# Patient Record
Sex: MALE | Race: WHITE | NOT HISPANIC OR LATINO | Employment: OTHER | URBAN - METROPOLITAN AREA
[De-identification: names, ages, dates, MRNs, and addresses within clinical notes are randomized per-mention and may not be internally consistent; named-entity substitution may affect disease eponyms.]

---

## 2017-04-17 ENCOUNTER — APPOINTMENT (OUTPATIENT)
Dept: LAB | Facility: CLINIC | Age: 77
End: 2017-04-17
Payer: MEDICARE

## 2017-04-17 DIAGNOSIS — N18.30 CHRONIC KIDNEY DISEASE, STAGE III (MODERATE) (HCC): ICD-10-CM

## 2017-04-17 LAB
ALBUMIN SERPL BCP-MCNC: 3.8 G/DL (ref 3.5–5)
ANION GAP SERPL CALCULATED.3IONS-SCNC: 7 MMOL/L (ref 4–13)
BUN SERPL-MCNC: 17 MG/DL (ref 5–25)
CALCIUM SERPL-MCNC: 8.8 MG/DL (ref 8.3–10.1)
CHLORIDE SERPL-SCNC: 109 MMOL/L (ref 100–108)
CO2 SERPL-SCNC: 27 MMOL/L (ref 21–32)
CREAT SERPL-MCNC: 1.4 MG/DL (ref 0.6–1.3)
GFR SERPL CREATININE-BSD FRML MDRD: 49.1 ML/MIN/1.73SQ M
GLUCOSE P FAST SERPL-MCNC: 100 MG/DL (ref 65–99)
PHOSPHATE SERPL-MCNC: 3.5 MG/DL (ref 2.3–4.1)
POTASSIUM SERPL-SCNC: 4.1 MMOL/L (ref 3.5–5.3)
PTH-INTACT SERPL-MCNC: 32.3 PG/ML (ref 14–72)
SODIUM SERPL-SCNC: 143 MMOL/L (ref 136–145)

## 2017-04-17 PROCEDURE — 83970 ASSAY OF PARATHORMONE: CPT

## 2017-04-17 PROCEDURE — 36415 COLL VENOUS BLD VENIPUNCTURE: CPT

## 2017-04-17 PROCEDURE — 80069 RENAL FUNCTION PANEL: CPT

## 2017-04-19 ENCOUNTER — ALLSCRIPTS OFFICE VISIT (OUTPATIENT)
Dept: OTHER | Facility: OTHER | Age: 77
End: 2017-04-19

## 2017-11-01 DIAGNOSIS — N18.30 CHRONIC KIDNEY DISEASE, STAGE III (MODERATE) (HCC): ICD-10-CM

## 2017-11-06 ENCOUNTER — APPOINTMENT (OUTPATIENT)
Dept: LAB | Facility: CLINIC | Age: 77
End: 2017-11-06
Payer: MEDICARE

## 2017-11-06 DIAGNOSIS — N18.30 CHRONIC KIDNEY DISEASE, STAGE III (MODERATE) (HCC): ICD-10-CM

## 2017-11-06 LAB
ALBUMIN SERPL BCP-MCNC: 3.3 G/DL (ref 3.5–5)
ALP SERPL-CCNC: 61 U/L (ref 46–116)
ALT SERPL W P-5'-P-CCNC: 14 U/L (ref 12–78)
ANION GAP SERPL CALCULATED.3IONS-SCNC: 8 MMOL/L (ref 4–13)
AST SERPL W P-5'-P-CCNC: 10 U/L (ref 5–45)
BILIRUB SERPL-MCNC: 0.5 MG/DL (ref 0.2–1)
BUN SERPL-MCNC: 17 MG/DL (ref 5–25)
CALCIUM SERPL-MCNC: 9 MG/DL (ref 8.3–10.1)
CHLORIDE SERPL-SCNC: 106 MMOL/L (ref 100–108)
CO2 SERPL-SCNC: 25 MMOL/L (ref 21–32)
CREAT SERPL-MCNC: 1.33 MG/DL (ref 0.6–1.3)
ERYTHROCYTE [DISTWIDTH] IN BLOOD BY AUTOMATED COUNT: 14.1 % (ref 11.6–15.1)
GFR SERPL CREATININE-BSD FRML MDRD: 51 ML/MIN/1.73SQ M
GLUCOSE P FAST SERPL-MCNC: 108 MG/DL (ref 65–99)
HCT VFR BLD AUTO: 37.4 % (ref 36.5–49.3)
HGB BLD-MCNC: 12.4 G/DL (ref 12–17)
MCH RBC QN AUTO: 30.8 PG (ref 26.8–34.3)
MCHC RBC AUTO-ENTMCNC: 33.2 G/DL (ref 31.4–37.4)
MCV RBC AUTO: 93 FL (ref 82–98)
PLATELET # BLD AUTO: 369 THOUSANDS/UL (ref 149–390)
PMV BLD AUTO: 9.8 FL (ref 8.9–12.7)
POTASSIUM SERPL-SCNC: 4.1 MMOL/L (ref 3.5–5.3)
PROT SERPL-MCNC: 7.1 G/DL (ref 6.4–8.2)
RBC # BLD AUTO: 4.03 MILLION/UL (ref 3.88–5.62)
SODIUM SERPL-SCNC: 139 MMOL/L (ref 136–145)
WBC # BLD AUTO: 11.7 THOUSAND/UL (ref 4.31–10.16)

## 2017-11-06 PROCEDURE — 80053 COMPREHEN METABOLIC PANEL: CPT

## 2017-11-06 PROCEDURE — 36415 COLL VENOUS BLD VENIPUNCTURE: CPT

## 2017-11-06 PROCEDURE — 85027 COMPLETE CBC AUTOMATED: CPT

## 2017-11-07 ENCOUNTER — ALLSCRIPTS OFFICE VISIT (OUTPATIENT)
Dept: OTHER | Facility: OTHER | Age: 77
End: 2017-11-07

## 2017-11-08 NOTE — PROGRESS NOTES
Assessment  1  CKD (chronic kidney disease), stage III (585 3) (N18 3)   2  Diabetes mellitus, type 2 (250 00) (E11 9)   3  Benign hypertension (401 1) (I10)    Plan  CKD (chronic kidney disease), stage III    · (1) CBC/ PLT (NO DIFF); Status:Active; Requested EOT:[id number]; Perform:[facility]; PFO:[id number];JRCVMWZ; For:CKD (chronic kidney disease), stage III; Ordered By:Demi Berman;   · (1) COMPREHENSIVE METABOLIC PANEL; Status:Active; Requested JDD:[id number]; Perform:[facility]; CSI:[id number];AMUILNW; For:CKD (chronic kidney disease), stage III; Ordered By:Demi Berman;   · (1) URINE PROTEIN CREATININE RATIO; Status:Active; Requested KOZ:[id number]; Perform:[facility]; XGO:[id number];MLKYBIB; For:CKD (chronic kidney disease), stage III; Ordered By:Demi Berman;   · Do not take anti-inflammatory medicines other than aspirin ; Status:Complete;   Done:  36MLQ4915   Ordered; For:CKD (chronic kidney disease), stage III; Ordered By:Demi Berman;   · Restrict the salt in your diet by avoiding highly salted foods ; Status:Complete;   Done:  53CUW6435   Ordered; For:CKD (chronic kidney disease), stage III; Ordered By:Demi Berman;     Discussion/Summary    1 ) Chronic kidney disease stage III without proteinuriaBaseline creatinine is 1 4-1 6 mg/dL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, as well as component of diabetesMost recent creatinine is 1 33 mg/dL and is stable at his baselineMost recent urine protein creatinine ratio is 0 1  HypertensionBlood pressure at goalMetoprolol extended release 200 mg dailyContinue amlodipine 5 mg dailyLow 2 g sodium dietAvoid NSAIDs  Diabetes mellitus type 2On metformin 500 mg daily  Chronic lymphocytic leukemiaCurrently receiving Gamma Gavin infusions every 2 monthsFollow with hematology/oncology  EdemaMuch improvedI suspected that it was related to the calcium channel blocker, increasing the dose may lead to edema developing again  Mineral bone disorderâchronic kidney diseasePTH at goalPhosphorus and calcium are normalHis vitamin D stores have improved to our target range continue current vitamin D 1000 units daily  PneumoniaStarted on Levaquin  I have asked the patient close monitoring his blood sugars while on the antibiotics S2 also maybe take a probiotic concurrently  The patient was counseled regarding diagnostic results,-- instructions for management,-- risk factor reductions,-- prognosis,-- patient and family education,-- impressions,-- risks and benefits of treatment options,-- importance of compliance with treatment  total time of encounter was 30 minutes  Possible side effects of new medications were reviewed with the patient/guardian today  The treatment plan was reviewed with the patient/guardian  The patient/guardian understands and agrees with the treatment plan   He has no barriers to learning  CKD Teaching includes NFK Guidelines, hypertension management, Avoid nephrotoxic medication, dietician counseling, sodium restriction and fluid management  Current Patient Status: no anemia-- and-- no worsening of renal function  He needs a follow up session in 6 months   Discussed with the patient      Reason For Visit  Chronic kidney disease      History of Present Illness  I the pleasure of seeing her today in the renal clinic for the continued management of his chronic kidney disease  He was recently diagnosed with pneumonia and started on levofloxacin for which she will take for the next 10 days  Otherwise he feels fine with no fevers or chills but he is having a productive cough  No nausea no vomiting or diarrhea  He does report compliance with his blood pressure medication  Reports no lightheadedness or dizziness  Review of Systems    Constitutional: No complaints of fever, no chills, no anorexia, no tiredness, no recent weight gain or weight loss  Integumentary: No complaints of skin rash     Gastrointestinal: No complains of abdominal pain, no constipation or diarrhea, no nausea or vomiting  Respiratory: coughing up sputum, but-- no shortness of breath-- and-- no cough  Cardiovascular: No complaints of orthopnea, no PND, no chest pain, no palpitations, no lower extremity edema  Musculoskeletal: No complaints of joint pain or swelling  Neurological: No complaints of headache, no lightheadedness or dizziness  Genitourinary: No dysuria, no hematuria, no nocturia, no urinary frequency, no incomplete emptying of bladder, no foamy urine  Eyes: No complaints of eyesight problems or dryness of eyes  ENT: no complaints of hearing loss, no nasal discharge  ROS reviewed  Past Medical History    The active problems and past medical history were reviewed and updated today  Surgical History    The surgical history was reviewed and updated today  Family History    The family history was reviewed and updated today  Social History  The social history was reviewed and updated today  The social history was reviewed and is unchanged  Current Meds   1  AmLODIPine Besylate 5 MG Oral Tablet; TAKE 1 TABLET DAILY; Therapy: 09UCS7045 to (Evaluate:10Jan2015) Recorded   2  Aspirin 81 MG TABS; take 2 tablet daily; Therapy: 14TRX2998 to Recorded   3  Fish Oil 1200 MG Oral Capsule; take 1 capsule daily; Therapy: 69Qfd7180 to Recorded   4  GamaSTAN S/D Intramuscular Injectable; USE AS DIRECTED; Therapy: 36SRH7730 to Recorded   5  MetFORMIN HCl - 500 MG Oral Tablet; TAKE 1 TABLET DAILY WITH FOOD; Therapy: 40BIX4470 to Recorded   6  Metoprolol Succinate  MG Oral Tablet Extended Release 24 Hour; TAKE 1 TABLET   ONCE DAILY; Therapy: 83IQY1097 to Recorded   7  Pravastatin Sodium 20 MG Oral Tablet; TAKE 1 TABLET DAILY; Therapy: 04TII2881 to Recorded   8  Vitamin D 1000 UNIT Oral Tablet; TAKE AS DIRECTED PER PACKAGE INSTRUCTIONS;    Therapy: 15Apr2015 to (Last Rx:15Apr2015)  Requested for: [phone] Ordered    The medication list was reviewed and updated today  Allergies  1  No Known Drug Allergies    Vitals  Vital Signs    Recorded: 77WRX4302 08:46AM Recorded: 04XSX5769 32:68UX   Systolic 438    Diastolic 78    Height  5 ft 10 in   Weight  203 lb    BMI Calculated  29 13   BSA Calculated  2 1     Physical Exam    Constitutional: General appearance: No acute distress, well appearing and well nourished  ENT: External ears and nose appear normal      Eyes: Anicteric sclerae  Neck: No bruit heard over either carotid  JVD:  No JVD present  Pulmonary: Respiratory effort: No increased work of breathing or signs of respiratory distress  -- Auscultation of lungs: Clear to auscultation  Cardiovascular: Auscultation of heart: Normal rate and rhythm, normal S1 and S2, without murmurs  Abdomen: Non-tender, no masses  Extremities: No cyanosis, clubbing or edema  Pulses: Dorsalis Pedis and Posterior Tibial pulses normal    Rash: No rash present  Neurologic: Non Focal      Psychiatric: Orientation to person, place, and time: Normal  -- and-- Mood and affect: Normal     Back: No CVA tenderness  Results/Data  (1) COMPREHENSIVE METABOLIC PANEL 88LGL0100 76:20LI Eran Breath Order Number: [id number]     Test Name Result Flag Reference   SODIUM 139 mmol/L  136-145   POTASSIUM 4 1 mmol/L  3 5-5 3   CHLORIDE 106 mmol/L  100-108   CARBON DIOXIDE 25 mmol/L  21-32   ANION GAP (CALC) 8 mmol/L  4-13   BLOOD UREA NITROGEN 17 mg/dL  5-25   CREATININE 1 33 mg/dL H 0 60-1 30   Standardized to IDMS reference method   CALCIUM 9 0 mg/dL  8 3-10 1   BILI, TOTAL 0 50 mg/dL  0 20-1 00   ALK PHOSPHATAS 61 U/L     ALT (SGPT) 14 U/L  12-78   Specimen collection should occur prior to Sulfasalazine and/or Sulfapyridine administration due to the potential for falsely depressed results     AST(SGOT) 10 U/L  5-45   Specimen collection should occur prior to Sulfasalazine administration due to the potential for falsely depressed results  ALBUMIN 3 3 g/dL L 3 5-5 0   TOTAL PROTEIN 7 1 g/dL  6 4-8 2   eGFR 51 ml/min/1 73sq m     [facility] Disease Education Program recommendations are as follows:  GFR calculation is accurate only with a steady state creatinine  Chronic Kidney disease less than 60 ml/min/1 73 sq  meters  Kidney failure less than 15 ml/min/1 73 sq  meters  GLUCOSE FASTING 108 mg/dL H 65-99   Specimen collection should occur prior to Sulfasalazine administration due to the potential for falsely depressed results  Specimen collection should occur prior to Sulfapyridine administration due to the potential for falsely elevated results  (1) CBC/ PLT (NO DIFF) 08LML8046 08:13AM Verbena Brunner    Order Number: [id number]     Test Name Result Flag Reference   HEMATOCRIT 37 4 %  36 5-49 3   HEMOGLOBIN 12 4 g/dL  12 0-17 0   MCHC 33 2 g/dL  31 4-37 4   MCH 30 8 pg  26 8-34 3   MCV 93 fL  82-98   PLATELET COUNT 170 Thousands/uL  149-390   RBC COUNT 4 03 Million/uL  3 88-5 62   RDW 14 1 %  11 6-15 1   WBC COUNT 11 70 Thousand/uL H 4 31-10 16   MPV 9 8 fL  8 9-12 7     Diagnostic Studies Reviewed: I personally reviewed the films/images/results in the office today  My interpretation follows  Thank you very much for seeing this patient  If you have any questions, please do not hesitate to contact me        Signatures   Electronically signed by : FERCHO Hernández ; Nov 7 2017  8:49AM EST                       (Author)

## 2017-12-05 ENCOUNTER — TRANSCRIBE ORDERS (OUTPATIENT)
Dept: LAB | Facility: CLINIC | Age: 77
End: 2017-12-05

## 2017-12-05 ENCOUNTER — APPOINTMENT (OUTPATIENT)
Dept: LAB | Facility: CLINIC | Age: 77
End: 2017-12-05
Payer: MEDICARE

## 2017-12-05 DIAGNOSIS — N13.8 ENLARGED PROSTATE WITH URINARY OBSTRUCTION: Primary | ICD-10-CM

## 2017-12-05 DIAGNOSIS — N40.1 ENLARGED PROSTATE WITH URINARY OBSTRUCTION: Primary | ICD-10-CM

## 2017-12-05 DIAGNOSIS — N13.8 ENLARGED PROSTATE WITH URINARY OBSTRUCTION: ICD-10-CM

## 2017-12-05 DIAGNOSIS — N40.1 ENLARGED PROSTATE WITH URINARY OBSTRUCTION: ICD-10-CM

## 2017-12-05 LAB
BUN SERPL-MCNC: 18 MG/DL (ref 5–25)
CREAT SERPL-MCNC: 1.33 MG/DL (ref 0.6–1.3)
GFR SERPL CREATININE-BSD FRML MDRD: 51 ML/MIN/1.73SQ M
PSA SERPL-MCNC: 1 NG/ML (ref 0–4)

## 2017-12-05 PROCEDURE — 36415 COLL VENOUS BLD VENIPUNCTURE: CPT

## 2017-12-05 PROCEDURE — 84520 ASSAY OF UREA NITROGEN: CPT

## 2017-12-05 PROCEDURE — 82565 ASSAY OF CREATININE: CPT

## 2017-12-05 PROCEDURE — G0103 PSA SCREENING: HCPCS

## 2018-01-13 VITALS
DIASTOLIC BLOOD PRESSURE: 60 MMHG | BODY MASS INDEX: 30.78 KG/M2 | WEIGHT: 215 LBS | SYSTOLIC BLOOD PRESSURE: 146 MMHG | HEIGHT: 70 IN

## 2018-01-14 VITALS
HEIGHT: 70 IN | BODY MASS INDEX: 29.06 KG/M2 | SYSTOLIC BLOOD PRESSURE: 128 MMHG | WEIGHT: 203 LBS | DIASTOLIC BLOOD PRESSURE: 78 MMHG

## 2018-01-14 NOTE — CONSULTS
I am referring Leydi Gonsalez to you for further evaluation  My most recent evaluation follows:      Reason For Visit  Chronic kidney disease      History of Present Illness  I the pleasure of seeing her today in the renal clinic for the continued management of his chronic kidney disease  He was recently diagnosed with pneumonia and started on levofloxacin for which she will take for the next 10 days  Otherwise he feels fine with no fevers or chills but he is having a productive cough  No nausea no vomiting or diarrhea  He does report compliance with his blood pressure medication  Reports no lightheadedness or dizziness  Review of Systems    Constitutional: No complaints of fever, no chills, no anorexia, no tiredness, no recent weight gain or weight loss  Integumentary: No complaints of skin rash  Gastrointestinal: No complains of abdominal pain, no constipation or diarrhea, no nausea or vomiting  Respiratory: coughing up sputum, but no shortness of breath and no cough  Cardiovascular: No complaints of orthopnea, no PND, no chest pain, no palpitations, no lower extremity edema  Musculoskeletal: No complaints of joint pain or swelling  Neurological: No complaints of headache, no lightheadedness or dizziness  Genitourinary: No dysuria, no hematuria, no nocturia, no urinary frequency, no incomplete emptying of bladder, no foamy urine  Eyes: No complaints of eyesight problems or dryness of eyes  ENT: no complaints of hearing loss, no nasal discharge  ROS reviewed  Past Medical History    The active problems and past medical history were reviewed and updated today  Surgical History    The surgical history was reviewed and updated today  Family History    The family history was reviewed and updated today  Social History  The social history was reviewed and updated today  The social history was reviewed and is unchanged  Current Meds   1   AmLODIPine Besylate 5 MG Oral Tablet; TAKE 1 TABLET DAILY; Therapy: 86VTG7126 to (Evaluate:10Jan2015) Recorded   2  Aspirin 81 MG TABS; take 2 tablet daily; Therapy: 09KAX6469 to Recorded   3  Fish Oil 1200 MG Oral Capsule; take 1 capsule daily; Therapy: 52Yui4746 to Recorded   4  GamaSTAN S/D Intramuscular Injectable; USE AS DIRECTED; Therapy: 14DOA9655 to Recorded   5  MetFORMIN HCl - 500 MG Oral Tablet; TAKE 1 TABLET DAILY WITH FOOD; Therapy: 27VHD0137 to Recorded   6  Metoprolol Succinate  MG Oral Tablet Extended Release 24 Hour; TAKE 1 TABLET   ONCE DAILY; Therapy: 82CXH6029 to Recorded   7  Pravastatin Sodium 20 MG Oral Tablet; TAKE 1 TABLET DAILY; Therapy: 28KAD0374 to Recorded   8  Vitamin D 1000 UNIT Oral Tablet; TAKE AS DIRECTED PER PACKAGE INSTRUCTIONS; Therapy: 15Apr2015 to (Last Rx:15Apr2015)  Requested for: 15Apr2015 Ordered    The medication list was reviewed and updated today  Allergies    1  No Known Drug Allergies    Vitals   Recorded: 02BHH0786 08:46AM Recorded: 69BCQ8096 75:53SJ   Systolic 149    Diastolic 78    Height  5 ft 10 in   Weight  203 lb    BMI Calculated  29 13   BSA Calculated  2 1     Physical Exam    Constitutional: General appearance: No acute distress, well appearing and well nourished  ENT: External ears and nose appear normal      Eyes: Anicteric sclerae  Neck: No bruit heard over either carotid  JVD:  No JVD present  Pulmonary: Respiratory effort: No increased work of breathing or signs of respiratory distress  Auscultation of lungs: Clear to auscultation  Cardiovascular: Auscultation of heart: Normal rate and rhythm, normal S1 and S2, without murmurs  Abdomen: Non-tender, no masses  Extremities: No cyanosis, clubbing or edema  Pulses: Dorsalis Pedis and Posterior Tibial pulses normal    Rash: No rash present     Neurologic: Non Focal      Psychiatric: Orientation to person, place, and time: Normal   and Mood and affect: Normal     Back: No CVA tenderness  Results/Data  (1) COMPREHENSIVE METABOLIC PANEL 22POF7822 75:15CQ Dusty Meyer Order Number: [id number]     Test Name Result Flag Reference   SODIUM 139 mmol/L  136-145   POTASSIUM 4 1 mmol/L  3 5-5 3   CHLORIDE 106 mmol/L  100-108   CARBON DIOXIDE 25 mmol/L  21-32   ANION GAP (CALC) 8 mmol/L  4-13   BLOOD UREA NITROGEN 17 mg/dL  5-25   CREATININE 1 33 mg/dL H 0 60-1 30   Standardized to IDMS reference method   CALCIUM 9 0 mg/dL  8 3-10 1   BILI, TOTAL 0 50 mg/dL  0 20-1 00   ALK PHOSPHATAS 61 U/L     ALT (SGPT) 14 U/L  12-78   Specimen collection should occur prior to Sulfasalazine and/or Sulfapyridine administration due to the potential for falsely depressed results  AST(SGOT) 10 U/L  5-45   Specimen collection should occur prior to Sulfasalazine administration due to the potential for falsely depressed results  ALBUMIN 3 3 g/dL L 3 5-5 0   TOTAL PROTEIN 7 1 g/dL  6 4-8 2   eGFR 51 ml/min/1 73sq [facility] Disease Education Program recommendations are as follows:  GFR calculation is accurate only with a steady state creatinine  Chronic Kidney disease less than 60 ml/min/1 73 sq  meters  Kidney failure less than 15 ml/min/1 73 sq  meters  GLUCOSE FASTING 108 mg/dL H 65-99   Specimen collection should occur prior to Sulfasalazine administration due to the potential for falsely depressed results  Specimen collection should occur prior to Sulfapyridine administration due to the potential for falsely elevated results       (1) CBC/ PLT (NO DIFF) 73FSY8233 08:13AM Dusty Meyer Order Number: [id number]     Test Name Result Flag Reference   HEMATOCRIT 37 4 %  36 5-49 3   HEMOGLOBIN 12 4 g/dL  12 0-17 0   MCHC 33 2 g/dL  31 4-37 4   MCH 30 8 pg  26 8-34 3   MCV 93 fL  82-98   PLATELET COUNT 184 Thousands/uL  149-390   RBC COUNT 4 03 Million/uL  3 88-5 62   RDW 14 1 %  11 6-15 1   WBC COUNT 11 70 Thousand/uL H 4 31-10 16   MPV 9 8 fL  8 9-12 7     I personally reviewed the films/images/results in the office today  My interpretation follows  Assessment    1  CKD (chronic kidney disease), stage III (585 3) (N18 3)   2  Diabetes mellitus, type 2 (250 00) (E11 9)   3  Benign hypertension (401 1) (I10)    Plan  CKD (chronic kidney disease), stage III    · (1) CBC/ PLT (NO DIFF); Status:Active; Requested DKB:[id number]; Perform:[facility]; VRM:[id number];DMCGVPD; For:CKD (chronic kidney disease), stage III; Ordered By:Demi Berman;   · (1) COMPREHENSIVE METABOLIC PANEL; Status:Active; Requested MTF:[id number]; Perform:[facility]; QNH:[id number];OFKOOHP; For:CKD (chronic kidney disease), stage III; Ordered By:Demi Berman;   · (1) URINE PROTEIN CREATININE RATIO; Status:Active; Requested STEPHANIE:[id number]; Perform:[facility]; KYN:[id number];UHQRLUZ; For:CKD (chronic kidney disease), stage III; Ordered By:Demi Berman;   · Do not take anti-inflammatory medicines other than aspirin ; Status:Complete;   Done:  85XVW1480   Ordered; For:CKD (chronic kidney disease), stage III; Ordered By:Demi Berman;   · Restrict the salt in your diet by avoiding highly salted foods ; Status:Complete;   Done:  16DEN1742   Ordered; For:CKD (chronic kidney disease), stage III; Ordered By:Demi Berman;     Discussion/Summary    1 ) Chronic kidney disease stage III without proteinuria  - Baseline creatinine is 1 4-1 6 mg/dL  - 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, as well as component of diabetes  - Most recent creatinine is 1 33 mg/dL and is stable at his baseline  - Most recent urine protein creatinine ratio is 0 1    2 ) Hypertension  - Blood pressure at goal  - Metoprolol extended release 200 mg daily  - Continue amlodipine 5 mg daily  - Low 2 g sodium diet  - Avoid NSAIDs    3 ) Diabetes mellitus type 2  - On metformin 500 mg daily    4 ) Chronic lymphocytic leukemia  - Currently receiving Gamma Gavin infusions every 2 months  - Follow with hematology/oncology    5 ) Edema  - Much improved  - I suspected that it was related to the calcium channel blocker, increasing the dose may lead to edema developing again    6 ) Mineral bone disorder-chronic kidney disease  - PTH at goal  - Phosphorus and calcium are normal  - His vitamin D stores have improved to our target range continue current vitamin D 1000 units daily  7 ) Pneumonia  - Started on Levaquin  I have asked the patient close monitoring his blood sugars while on the antibiotics S2 also maybe take a probiotic concurrently  The patient was counseled regarding diagnostic results, instructions for management, risk factor reductions, prognosis, patient and family education, impressions, risks and benefits of treatment options, importance of compliance with treatment  total time of encounter was 30 minutes  Possible side effects of new medications were reviewed with the patient/guardian today  The treatment plan was reviewed with the patient/guardian  The patient/guardian understands and agrees with the treatment plan   He has no barriers to learning  CKD Teaching includes NFK Guidelines, hypertension management, Avoid nephrotoxic medication, dietician counseling, sodium restriction and fluid management  Current Patient Status: no anemia and no worsening of renal function  He needs a follow up session in 6 months   Discussed with the patient      Thank you very much for seeing this patient  If you have any questions, please do not hesitate to contact me        Signatures   Electronically signed by : FERCHO Figueroa ; Nov 7 2017  8:49AM EST                       (Author)

## 2018-06-01 DIAGNOSIS — N18.30 CHRONIC KIDNEY DISEASE, STAGE III (MODERATE) (HCC): ICD-10-CM

## 2018-06-13 ENCOUNTER — OFFICE VISIT (OUTPATIENT)
Dept: NEPHROLOGY | Facility: CLINIC | Age: 78
End: 2018-06-13
Payer: MEDICARE

## 2018-06-13 VITALS
BODY MASS INDEX: 29.62 KG/M2 | WEIGHT: 200 LBS | HEIGHT: 69 IN | SYSTOLIC BLOOD PRESSURE: 138 MMHG | DIASTOLIC BLOOD PRESSURE: 70 MMHG

## 2018-06-13 DIAGNOSIS — C91.10 CLL (CHRONIC LYMPHOCYTIC LEUKEMIA) (HCC): ICD-10-CM

## 2018-06-13 DIAGNOSIS — I10 ESSENTIAL HYPERTENSION: ICD-10-CM

## 2018-06-13 DIAGNOSIS — N18.30 CKD (CHRONIC KIDNEY DISEASE) STAGE 3, GFR 30-59 ML/MIN (HCC): Primary | ICD-10-CM

## 2018-06-13 DIAGNOSIS — I33.0 BACTERIAL ENDOCARDITIS, UNSPECIFIED CHRONICITY: ICD-10-CM

## 2018-06-13 PROBLEM — E11.9 TYPE 2 DIABETES MELLITUS, WITHOUT LONG-TERM CURRENT USE OF INSULIN (HCC): Status: ACTIVE | Noted: 2018-06-13

## 2018-06-13 PROBLEM — I63.9 CEREBROVASCULAR ACCIDENT (CVA) DUE TO EMBOLISM (HCC): Status: ACTIVE | Noted: 2018-06-13

## 2018-06-13 PROCEDURE — 99214 OFFICE O/P EST MOD 30 MIN: CPT | Performed by: INTERNAL MEDICINE

## 2018-06-13 RX ORDER — AMOXICILLIN 500 MG
1 CAPSULE ORAL DAILY
COMMUNITY
Start: 2015-04-15

## 2018-06-13 RX ORDER — PRAVASTATIN SODIUM 20 MG
1 TABLET ORAL DAILY
COMMUNITY
Start: 2014-12-11 | End: 2020-01-01 | Stop reason: ALTCHOICE

## 2018-06-13 RX ORDER — METOPROLOL SUCCINATE 200 MG/1
1 TABLET, EXTENDED RELEASE ORAL DAILY
COMMUNITY
Start: 2014-12-11 | End: 2020-01-01

## 2018-06-13 RX ORDER — AMLODIPINE BESYLATE 5 MG/1
1 TABLET ORAL DAILY
COMMUNITY
Start: 2014-12-11 | End: 2020-01-01 | Stop reason: ALTCHOICE

## 2018-06-13 NOTE — PROGRESS NOTES
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  He was treated for sepsis with a neutropenic fever and had been treated for left lower lobe pneumonia during a prior admission  He was found to have 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 and was treated with antibiotics  He completed antibiotics on May 17th and his PICC line was removed on May 18th  He has a follow-up with Cardiology next week  He currently feels well he has no residual symptoms from the CVA  We were not consulted during the hospitalization as his renal function had surprisingly been stable throughout the course his last blood work was done on May 3rd and his creatinine was 1 5 which is his baseline  He is no longer on Gamma Infusions for CLL      1 ) Chronic kidney disease stage III without proteinuria  · Baseline creatinine is 1 4-1 6 mg/dL  · 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, as well as component of diabetes  · Most recent creatinine is 1 5 mg/dL and is stable at his baseline  · Check a urine protein creatinine ratio at the next visit  · Renal function had remained stable during all the hospitalization     2 ) Hypertension  · Blood pressure acceptable  · Metoprolol extended release 200 mg daily  · Continue amlodipine 5 mg daily  · Low 2 g sodium diet  · Avoid NSAIDs     3 ) Diabetes mellitus type 2  · On metformin 500 mg daily     4 ) Chronic lymphocytic leukemia  · No longer receiving Gamma Gavin infusions  · Follow with hematology/oncology     5 ) Infective Endocardis  -completed antibiotics on May 17th  -follow-up with Cardiology next week  -strep pneumoniae septicemia, MV infective endocarditis     6 ) Mineral bone disorder-chronic kidney disease  · PTH at goal  · Phosphorus and calcium are normal  · His vitamin D stores have improved to our target range continue current vitamin D 1000 units daily  PATIENT INSTRUCTIONS:    Patient Instructions   1 )  Low 2 g sodium diet    2 )  Monitor weights at home    3 )  Avoid NSAIDs    4 )  Monitor blood pressure at home, call if blood pressure greater than 150/90 persistently            Orders Placed This Encounter   Procedures    Comprehensive metabolic panel     This is a patient instruction: Patient fasting for 8 hours or longer recommended  Standing Status:   Future     Standing Expiration Date:   6/13/2019    CBC     Standing Status:   Future     Standing Expiration Date:   6/13/2019    Protein / creatinine ratio, urine     Standing Status:   Future     Standing Expiration Date:   6/13/2019       OBJECTIVE:  Current Weight: Weight - Scale: 90 7 kg (200 lb)  Vitals:    06/13/18 1142   BP: 138/70   Weight: 90 7 kg (200 lb)   Height: 5' 9" (1 753 m)    Body mass index is 29 53 kg/m²  REVIEW OF SYSTEMS:    Review of Systems   Constitutional: Negative for activity change and fever  Respiratory: Negative for cough, chest tightness, shortness of breath and wheezing  Cardiovascular: Negative for chest pain and leg swelling  Gastrointestinal: Negative for abdominal pain, diarrhea, nausea and vomiting  Endocrine: Negative for polyuria  Genitourinary: Negative for difficulty urinating, dysuria, flank pain, frequency and urgency  Skin: Negative for rash  Neurological: Negative for dizziness, syncope, light-headedness and headaches  PHYSICAL EXAM:      Physical Exam   Constitutional: He is oriented to person, place, and time   He appears well-developed and well-nourished  No distress  HENT:   Head: Normocephalic and atraumatic  Eyes: Pupils are equal, round, and reactive to light  No scleral icterus  Neck: Normal range of motion  Neck supple  Cardiovascular: Normal rate, regular rhythm and normal heart sounds  Exam reveals no gallop and no friction rub  No murmur heard  Pulmonary/Chest: Effort normal and breath sounds normal  No respiratory distress  He has no wheezes  He has no rales  He exhibits no tenderness  Abdominal: Soft  Bowel sounds are normal  He exhibits no distension  There is no tenderness  There is no rebound  Musculoskeletal: Normal range of motion  He exhibits no edema  Neurological: He is alert and oriented to person, place, and time  Skin: No rash noted  He is not diaphoretic  Psychiatric: He has a normal mood and affect  Nursing note and vitals reviewed        Medications:    Current Outpatient Prescriptions:     amLODIPine (NORVASC) 5 mg tablet, Take 1 tablet by mouth daily, Disp: , Rfl:     aspirin 81 MG tablet, Take 2 tablets by mouth daily, Disp: , Rfl:     cholecalciferol (VITAMIN D3) 1,000 units tablet, Take 1,000 Units by mouth daily, Disp: , Rfl:     metFORMIN (GLUCOPHAGE) 500 mg tablet, Take 1 tablet by mouth Daily, Disp: , Rfl:     metoprolol succinate (TOPROL-XL) 200 MG 24 hr tablet, Take 1 tablet by mouth daily, Disp: , Rfl:     Omega-3 Fatty Acids (FISH OIL) 1200 MG CAPS, Take 1 capsule by mouth daily, Disp: , Rfl:     pravastatin (PRAVACHOL) 20 mg tablet, Take 1 tablet by mouth daily, Disp: , Rfl:     Laboratory Results:        Results for orders placed or performed in visit on 12/05/17   BUN   Result Value Ref Range    BUN 18 5 - 25 mg/dL   Creatinine, serum   Result Value Ref Range    Creatinine 1 33 (H) 0 60 - 1 30 mg/dL    eGFR 51 ml/min/1 73sq m   PSA   Result Value Ref Range    PSA 1 0 0 0 - 4 0 ng/mL

## 2018-06-13 NOTE — LETTER
June 13, 2018     Tomy Bower, [address]  [id number] [place] [address]    Patient: Sierra Alcaraz   YOB: 1940   Date of Visit: 6/13/2018       Dear Dr Judah Ybarra: Thank you for referring Michael Iqbal to me for evaluation  Below are my notes for this consultation  If you have questions, please do not hesitate to call me  I look forward to following your patient along with you  Sincerely,        Mesfin Mendoza MD        CC: No Recipients  Mesfin Mendoza MD  6/13/2018 12:15 PM  Sign at close encounter  [address] [place] [address] 65 y o  male MRN: [id number]  6/13/2018    Reason for Visit:  CKD    ASSESSMENT and PLAN:    I had the pleasure of seeing Gisele Hayes today in the renal clinic for the continued management of CKD  Since our last visit, he has had 2 hospitalizations to [address] [place] [address]   His most recent being on 4/24/2018 where he was admitted to [address] [place] [address] for altered mental status and fever of 101 9  He was found to have 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  He was treated for sepsis with a neutropenic fever and had been treated for left lower lobe pneumonia during a prior admission  He was found to have 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 and was treated with antibiotics  He completed antibiotics on May 17th and his PICC line was removed on May 18th  He has a follow-up with Cardiology next week  He currently feels well he has no residual symptoms from the CVA  We were not consulted during the hospitalization as his renal function had surprisingly been stable throughout the course his last blood work was done on May 3rd and his creatinine was 1 5 which is his baseline  He is no longer on Gamma Infusions for CLL      1 ) Chronic kidney disease stage III without proteinuria  · Baseline creatinine is 1 4-1 6 mg/dL  · 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, as well as component of diabetes  · Most recent creatinine is 1 5 mg/dL and is stable at his baseline  · Check a urine protein creatinine ratio at the next visit  · Renal function had remained stable during all the hospitalization     2 ) Hypertension  · Blood pressure acceptable  · Metoprolol extended release 200 mg daily  · Continue amlodipine 5 mg daily  · Low 2 g sodium diet  · Avoid NSAIDs     3 ) Diabetes mellitus type 2  · On metformin 500 mg daily     4 ) Chronic lymphocytic leukemia  · No longer receiving Gamma Gavin infusions  · Follow with hematology/oncology     5 ) Infective Endocardis  -completed antibiotics on May 17th  -follow-up with Cardiology next week  -strep pneumoniae septicemia, MV infective endocarditis     6 ) Mineral bone disorder-chronic kidney disease  · PTH at goal  · Phosphorus and calcium are normal  · His vitamin D stores have improved to our target range continue current vitamin D 1000 units daily  PATIENT INSTRUCTIONS:    Patient Instructions   1 )  Low 2 g sodium diet    2 )  Monitor weights at home    3 )  Avoid NSAIDs    4 )  Monitor blood pressure at home, call if blood pressure greater than 150/90 persistently            Orders Placed This Encounter   Procedures    Comprehensive metabolic panel     This is a patient instruction: Patient fasting for 8 hours or longer recommended  Standing Status:   Future     Standing Expiration Date:   6/13/2019    CBC     Standing Status:   Future     Standing Expiration Date:   6/13/2019    Protein / creatinine ratio, urine     Standing Status:   Future     Standing Expiration Date:   6/13/2019       OBJECTIVE:  Current Weight: Weight - Scale: 90 7 kg (200 lb)  Vitals:    06/13/18 1142   BP: 138/70   Weight: 90 7 kg (200 lb)   Height: 5' 9" (1 753 m)    Body mass index is 29 53 kg/m²  REVIEW OF SYSTEMS:    Review of Systems   Constitutional: Negative for activity change and fever     Respiratory: Negative for cough, chest tightness, shortness of breath and wheezing  Cardiovascular: Negative for chest pain and leg swelling  Gastrointestinal: Negative for abdominal pain, diarrhea, nausea and vomiting  Endocrine: Negative for polyuria  Genitourinary: Negative for difficulty urinating, dysuria, flank pain, frequency and urgency  Skin: Negative for rash  Neurological: Negative for dizziness, syncope, light-headedness and headaches  PHYSICAL EXAM:      Physical Exam   Constitutional: He is oriented to person, place, and time  He appears well-developed and well-nourished  No distress  HENT:   Head: Normocephalic and atraumatic  Eyes: Pupils are equal, round, and reactive to light  No scleral icterus  Neck: Normal range of motion  Neck supple  Cardiovascular: Normal rate, regular rhythm and normal heart sounds  Exam reveals no gallop and no friction rub  No murmur heard  Pulmonary/Chest: Effort normal and breath sounds normal  No respiratory distress  He has no wheezes  He has no rales  He exhibits no tenderness  Abdominal: Soft  Bowel sounds are normal  He exhibits no distension  There is no tenderness  There is no rebound  Musculoskeletal: Normal range of motion  He exhibits no edema  Neurological: He is alert and oriented to person, place, and time  Skin: No rash noted  He is not diaphoretic  Psychiatric: He has a normal mood and affect  Nursing note and vitals reviewed        Medications:    Current Outpatient Prescriptions:     amLODIPine (NORVASC) 5 mg tablet, Take 1 tablet by mouth daily, Disp: , Rfl:     aspirin 81 MG tablet, Take 2 tablets by mouth daily, Disp: , Rfl:     cholecalciferol (VITAMIN D3) 1,000 units tablet, Take 1,000 Units by mouth daily, Disp: , Rfl:     metFORMIN (GLUCOPHAGE) 500 mg tablet, Take 1 tablet by mouth Daily, Disp: , Rfl:     metoprolol succinate (TOPROL-XL) 200 MG 24 hr tablet, Take 1 tablet by mouth daily, Disp: , Rfl:     Omega-3 Fatty Acids (FISH OIL) 1200 MG CAPS, Take 1 capsule by mouth daily, Disp: , Rfl:     pravastatin (PRAVACHOL) 20 mg tablet, Take 1 tablet by mouth daily, Disp: , Rfl:     Laboratory Results:        Results for orders placed or performed in visit on 12/05/17   BUN   Result Value Ref Range    BUN 18 5 - 25 mg/dL   Creatinine, serum   Result Value Ref Range    Creatinine 1 33 (H) 0 60 - 1 30 mg/dL    eGFR 51 ml/min/1 73sq m   PSA   Result Value Ref Range    PSA 1 0 0 0 - 4 0 ng/mL

## 2018-10-04 ENCOUNTER — APPOINTMENT (OUTPATIENT)
Dept: LAB | Facility: CLINIC | Age: 78
End: 2018-10-04
Payer: MEDICARE

## 2018-10-04 DIAGNOSIS — N18.30 CKD (CHRONIC KIDNEY DISEASE) STAGE 3, GFR 30-59 ML/MIN (HCC): ICD-10-CM

## 2018-10-04 LAB
ALBUMIN SERPL BCP-MCNC: 3.8 G/DL (ref 3.5–5)
ALP SERPL-CCNC: 68 U/L (ref 46–116)
ALT SERPL W P-5'-P-CCNC: 14 U/L (ref 12–78)
ANION GAP SERPL CALCULATED.3IONS-SCNC: 4 MMOL/L (ref 4–13)
AST SERPL W P-5'-P-CCNC: 13 U/L (ref 5–45)
BILIRUB SERPL-MCNC: 0.88 MG/DL (ref 0.2–1)
BUN SERPL-MCNC: 20 MG/DL (ref 5–25)
CALCIUM SERPL-MCNC: 9 MG/DL (ref 8.3–10.1)
CHLORIDE SERPL-SCNC: 107 MMOL/L (ref 100–108)
CO2 SERPL-SCNC: 26 MMOL/L (ref 21–32)
CREAT SERPL-MCNC: 1.62 MG/DL (ref 0.6–1.3)
CREAT UR-MCNC: 77.8 MG/DL
ERYTHROCYTE [DISTWIDTH] IN BLOOD BY AUTOMATED COUNT: 14 % (ref 11.6–15.1)
GFR SERPL CREATININE-BSD FRML MDRD: 40 ML/MIN/1.73SQ M
GLUCOSE P FAST SERPL-MCNC: 99 MG/DL (ref 65–99)
HCT VFR BLD AUTO: 39.7 % (ref 36.5–49.3)
HGB BLD-MCNC: 12.7 G/DL (ref 12–17)
MCH RBC QN AUTO: 30.3 PG (ref 26.8–34.3)
MCHC RBC AUTO-ENTMCNC: 32 G/DL (ref 31.4–37.4)
MCV RBC AUTO: 95 FL (ref 82–98)
PLATELET # BLD AUTO: 234 THOUSANDS/UL (ref 149–390)
PMV BLD AUTO: 10.1 FL (ref 8.9–12.7)
POTASSIUM SERPL-SCNC: 4.5 MMOL/L (ref 3.5–5.3)
PROT SERPL-MCNC: 6.7 G/DL (ref 6.4–8.2)
PROT UR-MCNC: 9 MG/DL
PROT/CREAT UR: 0.12 MG/G{CREAT} (ref 0–0.1)
RBC # BLD AUTO: 4.19 MILLION/UL (ref 3.88–5.62)
SODIUM SERPL-SCNC: 137 MMOL/L (ref 136–145)
WBC # BLD AUTO: 9.49 THOUSAND/UL (ref 4.31–10.16)

## 2018-10-04 PROCEDURE — 82570 ASSAY OF URINE CREATININE: CPT

## 2018-10-04 PROCEDURE — 85027 COMPLETE CBC AUTOMATED: CPT

## 2018-10-04 PROCEDURE — 80053 COMPREHEN METABOLIC PANEL: CPT

## 2018-10-04 PROCEDURE — 36415 COLL VENOUS BLD VENIPUNCTURE: CPT

## 2018-10-04 PROCEDURE — 84156 ASSAY OF PROTEIN URINE: CPT

## 2018-10-09 ENCOUNTER — OFFICE VISIT (OUTPATIENT)
Dept: NEPHROLOGY | Facility: CLINIC | Age: 78
End: 2018-10-09
Payer: MEDICARE

## 2018-10-09 VITALS
DIASTOLIC BLOOD PRESSURE: 60 MMHG | HEIGHT: 69 IN | WEIGHT: 208 LBS | SYSTOLIC BLOOD PRESSURE: 128 MMHG | BODY MASS INDEX: 30.81 KG/M2

## 2018-10-09 DIAGNOSIS — N18.30 CKD (CHRONIC KIDNEY DISEASE) STAGE 3, GFR 30-59 ML/MIN (HCC): Primary | ICD-10-CM

## 2018-10-09 DIAGNOSIS — C91.10 CLL (CHRONIC LYMPHOCYTIC LEUKEMIA) (HCC): ICD-10-CM

## 2018-10-09 DIAGNOSIS — I10 ESSENTIAL HYPERTENSION: ICD-10-CM

## 2018-10-09 PROCEDURE — 99213 OFFICE O/P EST LOW 20 MIN: CPT | Performed by: INTERNAL MEDICINE

## 2018-10-09 NOTE — PROGRESS NOTES
NEPHROLOGY OFFICE VISIT   Willy Dominguez 65 y o  male MRN: [id number]  10/9/2018    Reason for Visit:   Chronic kidney disease    ASSESSMENT and PLAN:    I had the pleasure of seeing Olivia Vann today in the renal clinic for the continued management of chronic kidney disease and hypertension  Since our last visit, there has been no ER visits or hospitilizations  He currently has no complaints at this time and is feeling well  Patient denies any chest pain, shortness of breath and swelling  The last blood work was done on October 2018, which we have reviewed together  1 ) Chronic kidney disease stage III without proteinuria  · Baseline creatinine is 1 4-1 6 mg/dL  · 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, as well as component of diabetes  · Most recent creatinine is 1 6 mg/dL and is stable at his baseline  · UPC 0 12     2 ) Hypertension  · Blood pressure at goal  · Wide pulse pressure  · Metoprolol extended release 200 mg daily  · Continue amlodipine 5 mg daily  · Low 2 g sodium diet  · Avoid NSAIDs     3 ) Diabetes mellitus type 2  · On metformin 500 mg daily     4 ) Chronic lymphocytic leukemia  · No longer receiving Gamma Gavin infusions  · Follow with hematology/oncology     5 ) Infective Endocardis  -completed antibiotics on May 47YL  -complicated by embolic CVA  -strep pneumoniae septicemia, MV infective endocarditis     6 ) Mineral bone disorder-chronic kidney disease  · PTH at goal  · Phosphorus and calcium are normal  · His vitamin D stores have improved to our target range continue current vitamin D 1000 units daily       7 )   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  -no residual deficits    PATIENT INSTRUCTIONS:    Patient Instructions   1 )  Low 2 g sodium diet    2 )  Monitor weights at home    3 )  Avoid NSAIDs    4 )  Monitor blood pressure at home, call if blood pressure greater than 150/90 persistently              Orders Placed This Encounter   Procedures    Comprehensive metabolic panel     This is a patient instruction: Patient fasting for 8 hours or longer recommended  Standing Status:   Future     Standing Expiration Date:   10/9/2019    CBC     Standing Status:   Future     Standing Expiration Date:   10/9/2019    Protein / creatinine ratio, urine     Standing Status:   Future     Standing Expiration Date:   10/9/2019       OBJECTIVE:  Current Weight: Weight - Scale: 94 3 kg (208 lb)  Vitals:    10/09/18 0939   BP: 128/60   Weight: 94 3 kg (208 lb)   Height: 5' 9" (1 753 m)    Body mass index is 30 72 kg/m²  REVIEW OF SYSTEMS:    Review of Systems   Constitutional: Negative for activity change and fever  Respiratory: Negative for cough, chest tightness, shortness of breath and wheezing  Cardiovascular: Negative for chest pain and leg swelling  Gastrointestinal: Negative for abdominal pain, diarrhea, nausea and vomiting  Endocrine: Negative for polyuria  Genitourinary: Negative for difficulty urinating, dysuria, flank pain, frequency and urgency  Skin: Negative for rash  Neurological: Negative for dizziness, syncope, light-headedness and headaches  PHYSICAL EXAM:      Physical Exam   Constitutional: He is oriented to person, place, and time  He appears well-developed and well-nourished  No distress  HENT:   Head: Normocephalic and atraumatic  Eyes: Pupils are equal, round, and reactive to light  No scleral icterus  Neck: Normal range of motion  Neck supple  Cardiovascular: Normal rate, regular rhythm and normal heart sounds  Exam reveals no gallop and no friction rub  No murmur heard  Pulmonary/Chest: Effort normal and breath sounds normal  No respiratory distress  He has no wheezes  He has no rales  He exhibits no tenderness  Abdominal: Soft  Bowel sounds are normal  He exhibits no distension  There is no tenderness  There is no rebound  Musculoskeletal: Normal range of motion  He exhibits no edema     Neurological: He is alert and oriented to person, place, and time  Skin: No rash noted  He is not diaphoretic  Psychiatric: He has a normal mood and affect  Nursing note and vitals reviewed        Medications:    Current Outpatient Prescriptions:     amLODIPine (NORVASC) 5 mg tablet, Take 1 tablet by mouth daily, Disp: , Rfl:     aspirin 81 MG tablet, Take 2 tablets by mouth daily, Disp: , Rfl:     cholecalciferol (VITAMIN D3) 1,000 units tablet, Take 1,000 Units by mouth daily, Disp: , Rfl:     metFORMIN (GLUCOPHAGE) 500 mg tablet, Take 1 tablet by mouth Daily, Disp: , Rfl:     metoprolol succinate (TOPROL-XL) 200 MG 24 hr tablet, Take 1 tablet by mouth daily, Disp: , Rfl:     Omega-3 Fatty Acids (FISH OIL) 1200 MG CAPS, Take 1 capsule by mouth daily, Disp: , Rfl:     pravastatin (PRAVACHOL) 20 mg tablet, Take 1 tablet by mouth daily, Disp: , Rfl:     Laboratory Results:    Results from last 7 days  Lab Units 10/04/18  0740   WBC Thousand/uL 9 49   HEMOGLOBIN g/dL 12 7   HEMATOCRIT % 39 7   PLATELETS Thousands/uL 234   SODIUM mmol/L 137   POTASSIUM mmol/L 4 5   CHLORIDE mmol/L 107   CO2 mmol/L 26   BUN mg/dL 20   CREATININE mg/dL 1 62*   CALCIUM mg/dL 9 0       Results for orders placed or performed in visit on 10/04/18   Comprehensive metabolic panel   Result Value Ref Range    Sodium 137 136 - 145 mmol/L    Potassium 4 5 3 5 - 5 3 mmol/L    Chloride 107 100 - 108 mmol/L    CO2 26 21 - 32 mmol/L    ANION GAP 4 4 - 13 mmol/L    BUN 20 5 - 25 mg/dL    Creatinine 1 62 (H) 0 60 - 1 30 mg/dL    Glucose, Fasting 99 65 - 99 mg/dL    Calcium 9 0 8 3 - 10 1 mg/dL    AST 13 5 - 45 U/L    ALT 14 12 - 78 U/L    Alkaline Phosphatase 68 46 - 116 U/L    Total Protein 6 7 6 4 - 8 2 g/dL    Albumin 3 8 3 5 - 5 0 g/dL    Total Bilirubin 0 88 0 20 - 1 00 mg/dL    eGFR 40 ml/min/1 73sq m   CBC   Result Value Ref Range    WBC 9 49 4 31 - 10 16 Thousand/uL    RBC 4 19 3 88 - 5 62 Million/uL    Hemoglobin 12 7 12 0 - 17 0 g/dL    Hematocrit 39 7 36 5 - 49 3 % MCV 95 82 - 98 fL    MCH 30 3 26 8 - 34 3 pg    MCHC 32 0 31 4 - 37 4 g/dL    RDW 14 0 11 6 - 15 1 %    Platelets 742 527 - 356 Thousands/uL    MPV 10 1 8 9 - 12 7 fL   Protein / creatinine ratio, urine   Result Value Ref Range    Creatinine, Ur 77 8 mg/dL    Protein Urine Random 9 mg/dL    Prot/Creat Ratio, Ur 0 12 (H) 0 00 - 0 10

## 2018-12-07 ENCOUNTER — APPOINTMENT (OUTPATIENT)
Dept: LAB | Facility: CLINIC | Age: 78
End: 2018-12-07
Payer: MEDICARE

## 2018-12-07 ENCOUNTER — TRANSCRIBE ORDERS (OUTPATIENT)
Dept: LAB | Facility: CLINIC | Age: 78
End: 2018-12-07

## 2018-12-07 DIAGNOSIS — N40.1 BENIGN PROSTATIC HYPERPLASIA WITH LOWER URINARY TRACT SYMPTOMS, SYMPTOM DETAILS UNSPECIFIED: Primary | ICD-10-CM

## 2018-12-07 DIAGNOSIS — N40.1 BENIGN PROSTATIC HYPERPLASIA WITH LOWER URINARY TRACT SYMPTOMS, SYMPTOM DETAILS UNSPECIFIED: ICD-10-CM

## 2018-12-07 LAB
BUN SERPL-MCNC: 25 MG/DL (ref 5–25)
CREAT SERPL-MCNC: 1.46 MG/DL (ref 0.6–1.3)
GFR SERPL CREATININE-BSD FRML MDRD: 45 ML/MIN/1.73SQ M
PSA SERPL-MCNC: 1.3 NG/ML (ref 0–4)

## 2018-12-07 PROCEDURE — 82565 ASSAY OF CREATININE: CPT

## 2018-12-07 PROCEDURE — 36415 COLL VENOUS BLD VENIPUNCTURE: CPT

## 2018-12-07 PROCEDURE — 84520 ASSAY OF UREA NITROGEN: CPT

## 2018-12-07 PROCEDURE — G0103 PSA SCREENING: HCPCS

## 2019-01-01 ENCOUNTER — OFFICE VISIT (OUTPATIENT)
Dept: NEPHROLOGY | Facility: CLINIC | Age: 79
End: 2019-01-01
Payer: MEDICARE

## 2019-01-01 ENCOUNTER — TRANSCRIBE ORDERS (OUTPATIENT)
Dept: LAB | Facility: CLINIC | Age: 79
End: 2019-01-01

## 2019-01-01 ENCOUNTER — APPOINTMENT (OUTPATIENT)
Dept: LAB | Facility: CLINIC | Age: 79
End: 2019-01-01
Payer: MEDICARE

## 2019-01-01 VITALS
DIASTOLIC BLOOD PRESSURE: 66 MMHG | SYSTOLIC BLOOD PRESSURE: 126 MMHG | BODY MASS INDEX: 28.26 KG/M2 | HEIGHT: 69 IN | WEIGHT: 190.8 LBS

## 2019-01-01 DIAGNOSIS — N40.1 ENLARGED PROSTATE WITH URINARY OBSTRUCTION: ICD-10-CM

## 2019-01-01 DIAGNOSIS — N18.30 CKD (CHRONIC KIDNEY DISEASE) STAGE 3, GFR 30-59 ML/MIN (HCC): ICD-10-CM

## 2019-01-01 DIAGNOSIS — N18.30 CKD (CHRONIC KIDNEY DISEASE) STAGE 3, GFR 30-59 ML/MIN (HCC): Primary | ICD-10-CM

## 2019-01-01 DIAGNOSIS — N13.8 ENLARGED PROSTATE WITH URINARY OBSTRUCTION: ICD-10-CM

## 2019-01-01 DIAGNOSIS — N40.1 ENLARGED PROSTATE WITH URINARY OBSTRUCTION: Primary | ICD-10-CM

## 2019-01-01 DIAGNOSIS — I10 ESSENTIAL HYPERTENSION: ICD-10-CM

## 2019-01-01 DIAGNOSIS — C91.10 CLL (CHRONIC LYMPHOCYTIC LEUKEMIA) (HCC): ICD-10-CM

## 2019-01-01 DIAGNOSIS — N13.8 ENLARGED PROSTATE WITH URINARY OBSTRUCTION: Primary | ICD-10-CM

## 2019-01-01 LAB
25(OH)D3 SERPL-MCNC: 24.5 NG/ML (ref 30–100)
ALBUMIN SERPL BCP-MCNC: 3.9 G/DL (ref 3.5–5)
ALP SERPL-CCNC: 67 U/L (ref 46–116)
ALT SERPL W P-5'-P-CCNC: 15 U/L (ref 12–78)
ANION GAP SERPL CALCULATED.3IONS-SCNC: 6 MMOL/L (ref 4–13)
AST SERPL W P-5'-P-CCNC: 10 U/L (ref 5–45)
BILIRUB SERPL-MCNC: 0.73 MG/DL (ref 0.2–1)
BUN SERPL-MCNC: 15 MG/DL (ref 5–25)
BUN SERPL-MCNC: 20 MG/DL (ref 5–25)
CALCIUM SERPL-MCNC: 8.8 MG/DL (ref 8.3–10.1)
CHLORIDE SERPL-SCNC: 108 MMOL/L (ref 100–108)
CO2 SERPL-SCNC: 26 MMOL/L (ref 21–32)
CREAT SERPL-MCNC: 1.24 MG/DL (ref 0.6–1.3)
CREAT SERPL-MCNC: 1.29 MG/DL (ref 0.6–1.3)
ERYTHROCYTE [DISTWIDTH] IN BLOOD BY AUTOMATED COUNT: 14.4 % (ref 11.6–15.1)
GFR SERPL CREATININE-BSD FRML MDRD: 52 ML/MIN/1.73SQ M
GFR SERPL CREATININE-BSD FRML MDRD: 55 ML/MIN/1.73SQ M
GLUCOSE P FAST SERPL-MCNC: 97 MG/DL (ref 65–99)
HCT VFR BLD AUTO: 41.1 % (ref 36.5–49.3)
HGB BLD-MCNC: 13 G/DL (ref 12–17)
MCH RBC QN AUTO: 30 PG (ref 26.8–34.3)
MCHC RBC AUTO-ENTMCNC: 31.6 G/DL (ref 31.4–37.4)
MCV RBC AUTO: 95 FL (ref 82–98)
PLATELET # BLD AUTO: 271 THOUSANDS/UL (ref 149–390)
PMV BLD AUTO: 10.4 FL (ref 8.9–12.7)
POTASSIUM SERPL-SCNC: 4.3 MMOL/L (ref 3.5–5.3)
PROT SERPL-MCNC: 6.5 G/DL (ref 6.4–8.2)
PSA SERPL-MCNC: 0.8 NG/ML (ref 0–4)
PTH-INTACT SERPL-MCNC: 36.8 PG/ML (ref 18.4–80.1)
RBC # BLD AUTO: 4.34 MILLION/UL (ref 3.88–5.62)
SODIUM SERPL-SCNC: 140 MMOL/L (ref 136–145)
WBC # BLD AUTO: 12.7 THOUSAND/UL (ref 4.31–10.16)

## 2019-01-01 PROCEDURE — 83970 ASSAY OF PARATHORMONE: CPT

## 2019-01-01 PROCEDURE — 82565 ASSAY OF CREATININE: CPT

## 2019-01-01 PROCEDURE — 36415 COLL VENOUS BLD VENIPUNCTURE: CPT

## 2019-01-01 PROCEDURE — 80053 COMPREHEN METABOLIC PANEL: CPT

## 2019-01-01 PROCEDURE — 82306 VITAMIN D 25 HYDROXY: CPT

## 2019-01-01 PROCEDURE — 85027 COMPLETE CBC AUTOMATED: CPT

## 2019-01-01 PROCEDURE — 84520 ASSAY OF UREA NITROGEN: CPT

## 2019-01-01 PROCEDURE — 84153 ASSAY OF PSA TOTAL: CPT

## 2019-01-01 PROCEDURE — 99213 OFFICE O/P EST LOW 20 MIN: CPT | Performed by: INTERNAL MEDICINE

## 2019-02-06 ENCOUNTER — OFFICE VISIT (OUTPATIENT)
Dept: NEPHROLOGY | Facility: CLINIC | Age: 79
End: 2019-02-06
Payer: MEDICARE

## 2019-02-06 VITALS
DIASTOLIC BLOOD PRESSURE: 50 MMHG | WEIGHT: 200 LBS | BODY MASS INDEX: 29.62 KG/M2 | HEIGHT: 69 IN | SYSTOLIC BLOOD PRESSURE: 118 MMHG

## 2019-02-06 DIAGNOSIS — I10 ESSENTIAL HYPERTENSION: ICD-10-CM

## 2019-02-06 DIAGNOSIS — N18.30 CKD (CHRONIC KIDNEY DISEASE) STAGE 3, GFR 30-59 ML/MIN (HCC): Primary | ICD-10-CM

## 2019-02-06 DIAGNOSIS — C91.10 CLL (CHRONIC LYMPHOCYTIC LEUKEMIA) (HCC): ICD-10-CM

## 2019-02-06 PROCEDURE — 99214 OFFICE O/P EST MOD 30 MIN: CPT | Performed by: INTERNAL MEDICINE

## 2019-02-06 NOTE — PROGRESS NOTES
[facility] NEPHROLOGY OFFICE VISIT   Jesus Manuel Murray 77 y o  male MRN: [id number]  2/6/2019    Reason for Visit:  Chronic kidney disease    ASSESSMENT and PLAN:    I had the pleasure of seeing Zeina Junior today in the renal clinic for the continued management of chronic kidney disease  Since our last visit, there has been no ER visits or hospitilizations  He currently has no complaints at this time and is feeling well  Patient denies any chest pain, shortness of breath and swelling  The last blood work was done on January 2019, which we have reviewed together  Currently have some upper respiratory tract/sinus infection for which she is currently on Levaquin and prednisone      1 ) Chronic kidney disease stage III without proteinuria  · Baseline creatinine is 1 4-1 6 mg/dL  · 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, as well as component of diabetes  · Most recent creatinine is 1 4 mg/dL and is stable at his baseline  · UPC 0 2     2 ) Hypertension  · Blood pressure at goal  · Wide pulse pressure  · Metoprolol extended release 200 mg daily  · Continue amlodipine 5 mg daily  · Low 2 g sodium diet  · Avoid NSAIDs     3 ) Diabetes mellitus type 2  · On metformin 500 mg daily  · Hemoglobin A1c 5 7     4 ) Chronic lymphocytic leukemia  · No longer receiving Gamma Gavin infusions  · Follow with hematology/oncology- Dr Alexy Walters     5 ) Infective Endocardis  -completed antibiotics on May 40NL  -complicated by embolic CVA  -strep pneumoniae septicemia, MV infective endocarditis     6 ) Mineral bone disorder-chronic kidney disease  · PTH at goal  · Phosphorus and calcium are normal  · His vitamin D stores have improved to our target range continue current vitamin D 1000 units daily       7 )   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  -no residual deficits      PATIENT INSTRUCTIONS:    Patient Instructions   1 )  Low 2 g sodium diet    2 )  Monitor weights at home    3 )  Avoid NSAIDs    4 )  Monitor blood pressure at home, call if blood pressure greater than 150/90 persistently            Orders Placed This Encounter   Procedures    Comprehensive metabolic panel     Standing Status:   Future     Standing Expiration Date:   2/6/2020    CBC     Standing Status:   Future     Standing Expiration Date:   2/6/2020    Vitamin D 25 hydroxy     Standing Status:   Future     Standing Expiration Date:   2/6/2020    PTH, intact     Standing Status:   Future     Standing Expiration Date:   2/6/2020       OBJECTIVE:  Current Weight: Weight - Scale: 90 7 kg (200 lb)  Vitals:    02/06/19 1350   BP: 118/50   Weight: 90 7 kg (200 lb)   Height: 5' 9" (1 753 m)    Body mass index is 29 53 kg/m²  REVIEW OF SYSTEMS:    Review of Systems   Constitutional: Negative for activity change and fever  Respiratory: Negative for cough, chest tightness, shortness of breath and wheezing  Cardiovascular: Negative for chest pain and leg swelling  Gastrointestinal: Negative for abdominal pain, diarrhea, nausea and vomiting  Endocrine: Negative for polyuria  Genitourinary: Negative for difficulty urinating, dysuria, flank pain, frequency and urgency  Skin: Negative for rash  Neurological: Negative for dizziness, syncope, light-headedness and headaches  PHYSICAL EXAM:      Physical Exam   Constitutional: He is oriented to person, place, and time  He appears well-developed and well-nourished  No distress  HENT:   Head: Normocephalic and atraumatic  Eyes: Pupils are equal, round, and reactive to light  No scleral icterus  Neck: Normal range of motion  Neck supple  Cardiovascular: Normal rate, regular rhythm and normal heart sounds  Exam reveals no gallop and no friction rub  No murmur heard  Pulmonary/Chest: Effort normal and breath sounds normal  No respiratory distress  He has no wheezes  He has no rales  He exhibits no tenderness  Abdominal: Soft  Bowel sounds are normal  He exhibits no distension  There is no tenderness   There is no rebound  Musculoskeletal: Normal range of motion  He exhibits no edema  Neurological: He is alert and oriented to person, place, and time  Skin: No rash noted  He is not diaphoretic  Psychiatric: He has a normal mood and affect  Nursing note and vitals reviewed        Medications:    Current Outpatient Prescriptions:     amLODIPine (NORVASC) 5 mg tablet, Take 1 tablet by mouth daily, Disp: , Rfl:     aspirin 81 MG tablet, Take 2 tablets by mouth daily, Disp: , Rfl:     cholecalciferol (VITAMIN D3) 1,000 units tablet, Take 1,000 Units by mouth daily, Disp: , Rfl:     metFORMIN (GLUCOPHAGE) 500 mg tablet, Take 1 tablet by mouth Daily, Disp: , Rfl:     metoprolol succinate (TOPROL-XL) 200 MG 24 hr tablet, Take 1 tablet by mouth daily, Disp: , Rfl:     Omega-3 Fatty Acids (FISH OIL) 1200 MG CAPS, Take 1 capsule by mouth daily, Disp: , Rfl:     pravastatin (PRAVACHOL) 20 mg tablet, Take 1 tablet by mouth daily, Disp: , Rfl:     Laboratory Results:        Invalid input(s): ALBUMIN    Results for orders placed or performed in visit on 12/07/18   BUN   Result Value Ref Range    BUN 25 5 - 25 mg/dL   Creatinine, serum   Result Value Ref Range    Creatinine 1 46 (H) 0 60 - 1 30 mg/dL    eGFR 45 ml/min/1 73sq m   PSA, Total Screen   Result Value Ref Range    PSA 1 3 0 0 - 4 0 ng/mL

## 2019-02-06 NOTE — LETTER
February 6, 2019     Wanda Hutton MD  [address]  [address] [address]    Patient: Jesus Manuel Murray   YOB: 1940   Date of Visit: 2/6/2019       Dear Dr Leonor Correia: Thank you for referring Za Dubon to me for evaluation  Below are my notes for this consultation  If you have questions, please do not hesitate to call me  I look forward to following your patient along with you  Sincerely,        Bertha Bass MD        CC: MD Bertha Laura MD  2/6/2019  2:09 PM  Sign at close encounter    NEPHROLOGY OFFICE VISIT   Jesus Manuel Murray 77 y o  male MRN: [id number]  2/6/2019    Reason for Visit:  Chronic kidney disease    ASSESSMENT and PLAN:    I had the pleasure of seeing Zeina Junior today in the renal clinic for the continued management of chronic kidney disease  Since our last visit, there has been no ER visits or hospitilizations  He currently has no complaints at this time and is feeling well  Patient denies any chest pain, shortness of breath and swelling  The last blood work was done on January 2019, which we have reviewed together  Currently have some upper respiratory tract/sinus infection for which she is currently on Levaquin and prednisone      1 ) Chronic kidney disease stage III without proteinuria  · Baseline creatinine is 1 4-1 6 mg/dL  · 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, as well as component of diabetes  · Most recent creatinine is 1 4 mg/dL and is stable at his baseline  · UPC 0 2     2 ) Hypertension  · Blood pressure at goal  · Wide pulse pressure  · Metoprolol extended release 200 mg daily  · Continue amlodipine 5 mg daily  · Low 2 g sodium diet  · Avoid NSAIDs     3 ) Diabetes mellitus type 2  · On metformin 500 mg daily  · Hemoglobin A1c 5 7     4 ) Chronic lymphocytic leukemia  · No longer receiving Gamma Gavin infusions  · Follow with hematology/oncology- Dr Alexy Walters     5 ) Infective Endocardis  -completed antibiotics on May 26MQ  -complicated by embolic CVA  -strep pneumoniae septicemia, MV infective endocarditis     6 ) Mineral bone disorder-chronic kidney disease  · PTH at goal  · Phosphorus and calcium are normal  · His vitamin D stores have improved to our target range continue current vitamin D 1000 units daily       7 )   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  -no residual deficits      PATIENT INSTRUCTIONS:    Patient Instructions   1 )  Low 2 g sodium diet    2 )  Monitor weights at home    3 )  Avoid NSAIDs    4 )  Monitor blood pressure at home, call if blood pressure greater than 150/90 persistently            Orders Placed This Encounter   Procedures    Comprehensive metabolic panel     Standing Status:   Future     Standing Expiration Date:   2/6/2020    CBC     Standing Status:   Future     Standing Expiration Date:   2/6/2020    Vitamin D 25 hydroxy     Standing Status:   Future     Standing Expiration Date:   2/6/2020    PTH, intact     Standing Status:   Future     Standing Expiration Date:   2/6/2020       OBJECTIVE:  Current Weight: Weight - Scale: 90 7 kg (200 lb)  Vitals:    02/06/19 1350   BP: 118/50   Weight: 90 7 kg (200 lb)   Height: 5' 9" (1 753 m)    Body mass index is 29 53 kg/m²  REVIEW OF SYSTEMS:    Review of Systems   Constitutional: Negative for activity change and fever  Respiratory: Negative for cough, chest tightness, shortness of breath and wheezing  Cardiovascular: Negative for chest pain and leg swelling  Gastrointestinal: Negative for abdominal pain, diarrhea, nausea and vomiting  Endocrine: Negative for polyuria  Genitourinary: Negative for difficulty urinating, dysuria, flank pain, frequency and urgency  Skin: Negative for rash  Neurological: Negative for dizziness, syncope, light-headedness and headaches  PHYSICAL EXAM:      Physical Exam   Constitutional: He is oriented to person, place, and time  He appears well-developed and well-nourished  No distress  HENT:   Head: Normocephalic and atraumatic  Eyes: Pupils are equal, round, and reactive to light  No scleral icterus  Neck: Normal range of motion  Neck supple  Cardiovascular: Normal rate, regular rhythm and normal heart sounds  Exam reveals no gallop and no friction rub  No murmur heard  Pulmonary/Chest: Effort normal and breath sounds normal  No respiratory distress  He has no wheezes  He has no rales  He exhibits no tenderness  Abdominal: Soft  Bowel sounds are normal  He exhibits no distension  There is no tenderness  There is no rebound  Musculoskeletal: Normal range of motion  He exhibits no edema  Neurological: He is alert and oriented to person, place, and time  Skin: No rash noted  He is not diaphoretic  Psychiatric: He has a normal mood and affect  Nursing note and vitals reviewed        Medications:    Current Outpatient Prescriptions:     amLODIPine (NORVASC) 5 mg tablet, Take 1 tablet by mouth daily, Disp: , Rfl:     aspirin 81 MG tablet, Take 2 tablets by mouth daily, Disp: , Rfl:     cholecalciferol (VITAMIN D3) 1,000 units tablet, Take 1,000 Units by mouth daily, Disp: , Rfl:     metFORMIN (GLUCOPHAGE) 500 mg tablet, Take 1 tablet by mouth Daily, Disp: , Rfl:     metoprolol succinate (TOPROL-XL) 200 MG 24 hr tablet, Take 1 tablet by mouth daily, Disp: , Rfl:     Omega-3 Fatty Acids (FISH OIL) 1200 MG CAPS, Take 1 capsule by mouth daily, Disp: , Rfl:     pravastatin (PRAVACHOL) 20 mg tablet, Take 1 tablet by mouth daily, Disp: , Rfl:     Laboratory Results:        Invalid input(s): ALBUMIN    Results for orders placed or performed in visit on 12/07/18   BUN   Result Value Ref Range    BUN 25 5 - 25 mg/dL   Creatinine, serum   Result Value Ref Range    Creatinine 1 46 (H) 0 60 - 1 30 mg/dL    eGFR 45 ml/min/1 73sq m   PSA, Total Screen   Result Value Ref Range    PSA 1 3 0 0 - 4 0 ng/mL

## 2019-09-04 NOTE — PATIENT INSTRUCTIONS
1  )  Low 2 g sodium diet    2 )  Monitor weights at home    3 )  Avoid NSAIDs    4 )  Monitor blood pressure at home, call if blood pressure greater than 150/90 persistently    5 ) Increase Vitamin D to 2,000 units daily

## 2019-09-04 NOTE — LETTER
September 4, 2019     Kelsea Rodriguez, [address]  [address] [facility]  [id number] [place] [address]    Patient: Claudean Likens   YOB: 1940   Date of Visit: 9/4/2019       Dear Dr Arsalan Mcdaniel Recipients: Thank you for referring Allyson Spencer to me for evaluation  Below are my notes for this consultation  If you have questions, please do not hesitate to call me  I look forward to following your patient along with you  Sincerely,        Melvin Grant MD        CC: No Recipients  Melvin Grant MD  9/4/2019  1:14 PM  Sign at close encounter  [address] 77 y o  male MRN: [id number]  9/4/2019    Reason for Visit:  Chronic kidney disease    ASSESSMENT and PLAN:    I had the pleasure of seeing Williams Rodriguez today in the renal clinic for the continued management of chronic kidney disease  Since our last visit, there has been no ER visits or hospitilizations  He currently has no complaints at this time and is feeling well  Patient denies any chest pain, shortness of breath  The last blood work was done on August 2019, which we have reviewed together  Overall he is doing quite well and reports no issues  He is having some mild ankle swelling which resolves in the morning when he is in a resting position with legs elevated  It is likely related to his amlodipine  He reports it does not bother him      1 ) Chronic kidney disease stage III without proteinuria  · Baseline creatinine is 1 4-1 6 mg/dL  · 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, as well as component of diabetes  · His renal function is currently stable and is actually below baseline with a creatinine of 1 2 (eGFR 55 cc/min)  · UPC 0 2     2 ) Hypertension  · Blood pressure at goal  · Wide pulse pressure  · Metoprolol extended release 200 mg daily  · Continue amlodipine 5 mg daily  · Low 2 g sodium diet  · Avoid NSAIDs     3 ) Diabetes mellitus type 2  · On metformin 500 mg daily  · Hemoglobin A1c 5 7     4 ) Chronic lymphocytic leukemia  · No longer receiving Gamma Gavin infusions  · Follow with hematology/oncology- Dr Destiny Chávez     5 ) Infective Endocardis  -completed antibiotics on May 51QK  -complicated by embolic CVA  -strep pneumoniae septicemia, MV infective endocarditis     6 ) Mineral bone disorder-chronic kidney disease  · PTH at goal  · Phosphorus and calcium are normal  · His vitamin D stores have improved to our target range continue current vitamin D 1000 units daily       7 )   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  -no residual deficits      PATIENT INSTRUCTIONS:    Patient Instructions   1 )  Low 2 g sodium diet    2 )  Monitor weights at home    3 )  Avoid NSAIDs    4 )  Monitor blood pressure at home, call if blood pressure greater than 150/90 persistently    5 ) Increase Vitamin D to 2,000 units daily              Orders Placed This Encounter   Procedures    Comprehensive metabolic panel     This is a patient instruction: Patient fasting for 8 hours or longer recommended     Standing Status:   Future     Standing Expiration Date:   9/4/2020    CBC     Standing Status:   Future     Standing Expiration Date:   9/4/2020    Protein / creatinine ratio, urine     Standing Status:   Future     Standing Expiration Date:   9/4/2020    Vitamin D 25 hydroxy     Standing Status:   Future     Standing Expiration Date:   9/4/2020    PTH, intact     Standing Status:   Future     Standing Expiration Date:   9/4/2020       OBJECTIVE:  Current Weight: Weight - Scale: 86 5 kg (190 lb 12 8 oz)  Vitals:    09/04/19 1300 09/04/19 1314   BP:  126/66   Weight: 86 5 kg (190 lb 12 8 oz)    Height: 5' 9" (1 753 m)     Body mass index is 28 18 kg/m²  REVIEW OF SYSTEMS:    Review of Systems   Constitutional: Negative for activity change and fever  Respiratory: Negative for cough, chest tightness, shortness of breath and wheezing  Cardiovascular: Negative for chest pain and leg swelling     Gastrointestinal: Negative for abdominal pain, diarrhea, nausea and vomiting  Endocrine: Negative for polyuria  Genitourinary: Negative for difficulty urinating, dysuria, flank pain, frequency and urgency  Skin: Negative for rash  Neurological: Negative for dizziness, syncope, light-headedness and headaches  PHYSICAL EXAM:      Physical Exam   Constitutional: He is oriented to person, place, and time  He appears well-developed and well-nourished  No distress  HENT:   Head: Normocephalic and atraumatic  Eyes: Pupils are equal, round, and reactive to light  No scleral icterus  Neck: Normal range of motion  Neck supple  Cardiovascular: Normal rate, regular rhythm and normal heart sounds  Exam reveals no gallop and no friction rub  No murmur heard  Pulmonary/Chest: Effort normal and breath sounds normal  No respiratory distress  He has no wheezes  He has no rales  He exhibits no tenderness  Abdominal: Soft  Bowel sounds are normal  He exhibits no distension  There is no tenderness  There is no rebound  Musculoskeletal: Normal range of motion  He exhibits no edema  Neurological: He is alert and oriented to person, place, and time  Skin: No rash noted  He is not diaphoretic  Psychiatric: He has a normal mood and affect  Nursing note and vitals reviewed        Medications:    Current Outpatient Medications:     amLODIPine (NORVASC) 5 mg tablet, Take 1 tablet by mouth daily, Disp: , Rfl:     aspirin 81 MG tablet, Take 2 tablets by mouth daily, Disp: , Rfl:     cholecalciferol (VITAMIN D3) 1,000 units tablet, Take 1,000 Units by mouth daily, Disp: , Rfl:     metFORMIN (GLUCOPHAGE) 500 mg tablet, Take 1 tablet by mouth Daily, Disp: , Rfl:     metoprolol succinate (TOPROL-XL) 200 MG 24 hr tablet, Take 1 tablet by mouth daily, Disp: , Rfl:     Omega-3 Fatty Acids (FISH OIL) 1200 MG CAPS, Take 1 capsule by mouth daily, Disp: , Rfl:     pravastatin (PRAVACHOL) 20 mg tablet, Take 1 tablet by mouth daily, Disp: , Rfl: Laboratory Results:  Results from last 7 days   Lab Units 08/29/19  0804   WBC Thousand/uL 12 70*   HEMOGLOBIN g/dL 13 0   HEMATOCRIT % 41 1   PLATELETS Thousands/uL 271   POTASSIUM mmol/L 4 3   CHLORIDE mmol/L 108   CO2 mmol/L 26   BUN mg/dL 20   CREATININE mg/dL 1 24   CALCIUM mg/dL 8 8       Results for orders placed or performed in visit on 08/29/19   Comprehensive metabolic panel   Result Value Ref Range    Sodium 140 136 - 145 mmol/L    Potassium 4 3 3 5 - 5 3 mmol/L    Chloride 108 100 - 108 mmol/L    CO2 26 21 - 32 mmol/L    ANION GAP 6 4 - 13 mmol/L    BUN 20 5 - 25 mg/dL    Creatinine 1 24 0 60 - 1 30 mg/dL    Glucose, Fasting 97 65 - 99 mg/dL    Calcium 8 8 8 3 - 10 1 mg/dL    AST 10 5 - 45 U/L    ALT 15 12 - 78 U/L    Alkaline Phosphatase 67 46 - 116 U/L    Total Protein 6 5 6 4 - 8 2 g/dL    Albumin 3 9 3 5 - 5 0 g/dL    Total Bilirubin 0 73 0 20 - 1 00 mg/dL    eGFR 55 ml/min/1 73sq m   CBC   Result Value Ref Range    WBC 12 70 (H) 4 31 - 10 16 Thousand/uL    RBC 4 34 3 88 - 5 62 Million/uL    Hemoglobin 13 0 12 0 - 17 0 g/dL    Hematocrit 41 1 36 5 - 49 3 %    MCV 95 82 - 98 fL    MCH 30 0 26 8 - 34 3 pg    MCHC 31 6 31 4 - 37 4 g/dL    RDW 14 4 11 6 - 15 1 %    Platelets 168 604 - 803 Thousands/uL    MPV 10 4 8 9 - 12 7 fL   Vitamin D 25 hydroxy   Result Value Ref Range    Vit D, 25-Hydroxy 24 5 (L) 30 0 - 100 0 ng/mL   PTH, intact   Result Value Ref Range    PTH 36 8 18 4 - 80 1 pg/mL

## 2019-09-04 NOTE — PROGRESS NOTES
NEPHROLOGY OFFICE VISIT   Sjuey Oneal 77 y o  male MRN: [id number]  9/4/2019    Reason for Visit:  Chronic kidney disease    ASSESSMENT and PLAN:    I had the pleasure of seeing Sania Capellan today in the renal clinic for the continued management of chronic kidney disease  Since our last visit, there has been no ER visits or hospitilizations  He currently has no complaints at this time and is feeling well  Patient denies any chest pain, shortness of breath  The last blood work was done on August 2019, which we have reviewed together  Overall he is doing quite well and reports no issues  He is having some mild ankle swelling which resolves in the morning when he is in a resting position with legs elevated  It is likely related to his amlodipine  He reports it does not bother him      1 ) Chronic kidney disease stage III without proteinuria  · Baseline creatinine is 1 4-1 6 mg/dL  · 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, as well as component of diabetes  · His renal function is currently stable and is actually below baseline with a creatinine of 1 2 (eGFR 55 cc/min)  · UPC 0 2     2 ) Hypertension  · Blood pressure at goal  · Wide pulse pressure  · Metoprolol extended release 200 mg daily  · Continue amlodipine 5 mg daily  · Low 2 g sodium diet  · Avoid NSAIDs     3 ) Diabetes mellitus type 2  · On metformin 500 mg daily  · Hemoglobin A1c 5 7     4 ) Chronic lymphocytic leukemia  · No longer receiving Gamma Gavin infusions  · Follow with hematology/oncology- Dr Laura Coffman     5 ) Infective Endocardis  -completed antibiotics on May 92YE  -complicated by embolic CVA  -strep pneumoniae septicemia, MV infective endocarditis     6 ) Mineral bone disorder-chronic kidney disease  · PTH at goal  · Phosphorus and calcium are normal  · His vitamin D stores have improved to our target range continue current vitamin D 1000 units daily       7 )   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  -no residual deficits      PATIENT INSTRUCTIONS:    Patient Instructions   1 )  Low 2 g sodium diet    2 )  Monitor weights at home    3 )  Avoid NSAIDs    4 )  Monitor blood pressure at home, call if blood pressure greater than 150/90 persistently    5 ) Increase Vitamin D to 2,000 units daily              Orders Placed This Encounter   Procedures    Comprehensive metabolic panel     This is a patient instruction: Patient fasting for 8 hours or longer recommended     Standing Status:   Future     Standing Expiration Date:   9/4/2020    CBC     Standing Status:   Future     Standing Expiration Date:   9/4/2020    Protein / creatinine ratio, urine     Standing Status:   Future     Standing Expiration Date:   9/4/2020    Vitamin D 25 hydroxy     Standing Status:   Future     Standing Expiration Date:   9/4/2020    PTH, intact     Standing Status:   Future     Standing Expiration Date:   9/4/2020       OBJECTIVE:  Current Weight: Weight - Scale: 86 5 kg (190 lb 12 8 oz)  Vitals:    09/04/19 1300 09/04/19 1314   BP:  126/66   Weight: 86 5 kg (190 lb 12 8 oz)    Height: 5' 9" (1 753 m)     Body mass index is 28 18 kg/m²  REVIEW OF SYSTEMS:    Review of Systems   Constitutional: Negative for activity change and fever  Respiratory: Negative for cough, chest tightness, shortness of breath and wheezing  Cardiovascular: Negative for chest pain and leg swelling  Gastrointestinal: Negative for abdominal pain, diarrhea, nausea and vomiting  Endocrine: Negative for polyuria  Genitourinary: Negative for difficulty urinating, dysuria, flank pain, frequency and urgency  Skin: Negative for rash  Neurological: Negative for dizziness, syncope, light-headedness and headaches  PHYSICAL EXAM:      Physical Exam   Constitutional: He is oriented to person, place, and time  He appears well-developed and well-nourished  No distress  HENT:   Head: Normocephalic and atraumatic  Eyes: Pupils are equal, round, and reactive to light   No scleral icterus  Neck: Normal range of motion  Neck supple  Cardiovascular: Normal rate, regular rhythm and normal heart sounds  Exam reveals no gallop and no friction rub  No murmur heard  Pulmonary/Chest: Effort normal and breath sounds normal  No respiratory distress  He has no wheezes  He has no rales  He exhibits no tenderness  Abdominal: Soft  Bowel sounds are normal  He exhibits no distension  There is no tenderness  There is no rebound  Musculoskeletal: Normal range of motion  He exhibits no edema  Neurological: He is alert and oriented to person, place, and time  Skin: No rash noted  He is not diaphoretic  Psychiatric: He has a normal mood and affect  Nursing note and vitals reviewed        Medications:    Current Outpatient Medications:     amLODIPine (NORVASC) 5 mg tablet, Take 1 tablet by mouth daily, Disp: , Rfl:     aspirin 81 MG tablet, Take 2 tablets by mouth daily, Disp: , Rfl:     cholecalciferol (VITAMIN D3) 1,000 units tablet, Take 1,000 Units by mouth daily, Disp: , Rfl:     metFORMIN (GLUCOPHAGE) 500 mg tablet, Take 1 tablet by mouth Daily, Disp: , Rfl:     metoprolol succinate (TOPROL-XL) 200 MG 24 hr tablet, Take 1 tablet by mouth daily, Disp: , Rfl:     Omega-3 Fatty Acids (FISH OIL) 1200 MG CAPS, Take 1 capsule by mouth daily, Disp: , Rfl:     pravastatin (PRAVACHOL) 20 mg tablet, Take 1 tablet by mouth daily, Disp: , Rfl:     Laboratory Results:  Results from last 7 days   Lab Units 08/29/19  0804   WBC Thousand/uL 12 70*   HEMOGLOBIN g/dL 13 0   HEMATOCRIT % 41 1   PLATELETS Thousands/uL 271   POTASSIUM mmol/L 4 3   CHLORIDE mmol/L 108   CO2 mmol/L 26   BUN mg/dL 20   CREATININE mg/dL 1 24   CALCIUM mg/dL 8 8       Results for orders placed or performed in visit on 08/29/19   Comprehensive metabolic panel   Result Value Ref Range    Sodium 140 136 - 145 mmol/L    Potassium 4 3 3 5 - 5 3 mmol/L    Chloride 108 100 - 108 mmol/L    CO2 26 21 - 32 mmol/L    ANION GAP 6 4 - 13 mmol/L    BUN 20 5 - 25 mg/dL    Creatinine 1 24 0 60 - 1 30 mg/dL    Glucose, Fasting 97 65 - 99 mg/dL    Calcium 8 8 8 3 - 10 1 mg/dL    AST 10 5 - 45 U/L    ALT 15 12 - 78 U/L    Alkaline Phosphatase 67 46 - 116 U/L    Total Protein 6 5 6 4 - 8 2 g/dL    Albumin 3 9 3 5 - 5 0 g/dL    Total Bilirubin 0 73 0 20 - 1 00 mg/dL    eGFR 55 ml/min/1 73sq m   CBC   Result Value Ref Range    WBC 12 70 (H) 4 31 - 10 16 Thousand/uL    RBC 4 34 3 88 - 5 62 Million/uL    Hemoglobin 13 0 12 0 - 17 0 g/dL    Hematocrit 41 1 36 5 - 49 3 %    MCV 95 82 - 98 fL    MCH 30 0 26 8 - 34 3 pg    MCHC 31 6 31 4 - 37 4 g/dL    RDW 14 4 11 6 - 15 1 %    Platelets 899 965 - 374 Thousands/uL    MPV 10 4 8 9 - 12 7 fL   Vitamin D 25 hydroxy   Result Value Ref Range    Vit D, 25-Hydroxy 24 5 (L) 30 0 - 100 0 ng/mL   PTH, intact   Result Value Ref Range    PTH 36 8 18 4 - 80 1 pg/mL

## 2020-01-01 ENCOUNTER — NURSING HOME VISIT (OUTPATIENT)
Dept: GERIATRICS | Facility: OTHER | Age: 80
End: 2020-01-01
Payer: MEDICARE

## 2020-01-01 ENCOUNTER — TELEPHONE (OUTPATIENT)
Dept: NEPHROLOGY | Facility: CLINIC | Age: 80
End: 2020-01-01

## 2020-01-01 ENCOUNTER — TELEPHONE (OUTPATIENT)
Dept: SPORTS MEDICINE | Facility: OTHER | Age: 80
End: 2020-01-01

## 2020-01-01 ENCOUNTER — TELEMEDICINE (OUTPATIENT)
Dept: FAMILY MEDICINE CLINIC | Facility: CLINIC | Age: 80
End: 2020-01-01
Payer: MEDICARE

## 2020-01-01 ENCOUNTER — APPOINTMENT (EMERGENCY)
Dept: RADIOLOGY | Facility: HOSPITAL | Age: 80
DRG: 374 | End: 2020-01-01
Payer: MEDICARE

## 2020-01-01 ENCOUNTER — TELEMEDICINE (OUTPATIENT)
Dept: GERIATRICS | Facility: OTHER | Age: 80
End: 2020-01-01
Payer: MEDICARE

## 2020-01-01 ENCOUNTER — HOSPITAL ENCOUNTER (INPATIENT)
Facility: HOSPITAL | Age: 80
LOS: 2 days | Discharge: HOME HEALTH | DRG: 374 | End: 2020-04-25
Attending: EMERGENCY MEDICINE | Admitting: HOSPITALIST
Payer: MEDICARE

## 2020-01-01 VITALS
DIASTOLIC BLOOD PRESSURE: 56 MMHG | HEART RATE: 64 BPM | HEIGHT: 70 IN | BODY MASS INDEX: 23.42 KG/M2 | RESPIRATION RATE: 16 BRPM | WEIGHT: 163.58 LBS | TEMPERATURE: 97.4 F | SYSTOLIC BLOOD PRESSURE: 133 MMHG | OXYGEN SATURATION: 94 %

## 2020-01-01 VITALS — HEIGHT: 70 IN | WEIGHT: 160 LBS | BODY MASS INDEX: 22.9 KG/M2

## 2020-01-01 VITALS
BODY MASS INDEX: 23.72 KG/M2 | WEIGHT: 165.3 LBS | HEART RATE: 91 BPM | SYSTOLIC BLOOD PRESSURE: 130 MMHG | RESPIRATION RATE: 22 BRPM | DIASTOLIC BLOOD PRESSURE: 61 MMHG | OXYGEN SATURATION: 96 % | TEMPERATURE: 97.9 F

## 2020-01-01 DIAGNOSIS — R50.9 FEVER, UNSPECIFIED FEVER CAUSE: ICD-10-CM

## 2020-01-01 DIAGNOSIS — K66.8 PNEUMOPERITONEUM: ICD-10-CM

## 2020-01-01 DIAGNOSIS — D63.8 ANEMIA OF CHRONIC DISEASE: ICD-10-CM

## 2020-01-01 DIAGNOSIS — R53.83 MALAISE AND FATIGUE: Primary | ICD-10-CM

## 2020-01-01 DIAGNOSIS — K21.9 GASTROESOPHAGEAL REFLUX DISEASE WITHOUT ESOPHAGITIS: ICD-10-CM

## 2020-01-01 DIAGNOSIS — E43 SEVERE PROTEIN-CALORIE MALNUTRITION (HCC): ICD-10-CM

## 2020-01-01 DIAGNOSIS — C15.5 MALIGNANT NEOPLASM OF LOWER THIRD OF ESOPHAGUS (HCC): ICD-10-CM

## 2020-01-01 DIAGNOSIS — R53.81 MALAISE AND FATIGUE: Primary | ICD-10-CM

## 2020-01-01 DIAGNOSIS — J18.9 PNEUMONIA OF LEFT LOWER LOBE DUE TO INFECTIOUS ORGANISM: Primary | ICD-10-CM

## 2020-01-01 DIAGNOSIS — R05.9 COUGH: ICD-10-CM

## 2020-01-01 DIAGNOSIS — R65.10 SIRS (SYSTEMIC INFLAMMATORY RESPONSE SYNDROME) (HCC): Primary | ICD-10-CM

## 2020-01-01 DIAGNOSIS — N18.30 CKD (CHRONIC KIDNEY DISEASE) STAGE 3, GFR 30-59 ML/MIN (HCC): ICD-10-CM

## 2020-01-01 DIAGNOSIS — I10 ESSENTIAL HYPERTENSION: ICD-10-CM

## 2020-01-01 DIAGNOSIS — F32.1 CURRENT MODERATE EPISODE OF MAJOR DEPRESSIVE DISORDER WITHOUT PRIOR EPISODE (HCC): ICD-10-CM

## 2020-01-01 DIAGNOSIS — A41.9 SEPSIS WITHOUT ACUTE ORGAN DYSFUNCTION, DUE TO UNSPECIFIED ORGANISM (HCC): Primary | ICD-10-CM

## 2020-01-01 DIAGNOSIS — I10 ESSENTIAL HYPERTENSION: Primary | ICD-10-CM

## 2020-01-01 DIAGNOSIS — E44.0 MODERATE PROTEIN-CALORIE MALNUTRITION (HCC): ICD-10-CM

## 2020-01-01 DIAGNOSIS — R53.83 MALAISE AND FATIGUE: ICD-10-CM

## 2020-01-01 DIAGNOSIS — R53.1 GENERALIZED WEAKNESS: ICD-10-CM

## 2020-01-01 DIAGNOSIS — E11.9 TYPE 2 DIABETES MELLITUS WITHOUT COMPLICATION, WITHOUT LONG-TERM CURRENT USE OF INSULIN (HCC): Primary | ICD-10-CM

## 2020-01-01 DIAGNOSIS — R53.81 MALAISE AND FATIGUE: ICD-10-CM

## 2020-01-01 DIAGNOSIS — Z71.89 GOALS OF CARE, COUNSELING/DISCUSSION: ICD-10-CM

## 2020-01-01 DIAGNOSIS — C15.5 MALIGNANT NEOPLASM OF LOWER THIRD OF ESOPHAGUS (HCC): Primary | ICD-10-CM

## 2020-01-01 DIAGNOSIS — A49.8 CLOSTRIDIUM DIFFICILE INFECTION: ICD-10-CM

## 2020-01-01 DIAGNOSIS — K11.7 INCREASED OROPHARYNGEAL SECRETIONS: ICD-10-CM

## 2020-01-01 DIAGNOSIS — R63.0 POOR APPETITE: ICD-10-CM

## 2020-01-01 DIAGNOSIS — J44.9 CHRONIC OBSTRUCTIVE PULMONARY DISEASE, UNSPECIFIED COPD TYPE (HCC): ICD-10-CM

## 2020-01-01 DIAGNOSIS — J18.9 PNEUMONIA OF LEFT LOWER LOBE DUE TO INFECTIOUS ORGANISM: ICD-10-CM

## 2020-01-01 DIAGNOSIS — R78.81 BACTEREMIA: Primary | ICD-10-CM

## 2020-01-01 DIAGNOSIS — K21.9 GERD (GASTROESOPHAGEAL REFLUX DISEASE): ICD-10-CM

## 2020-01-01 DIAGNOSIS — E87.1 HYPONATREMIA: ICD-10-CM

## 2020-01-01 DIAGNOSIS — A41.9 SEPSIS WITHOUT ACUTE ORGAN DYSFUNCTION, DUE TO UNSPECIFIED ORGANISM (HCC): ICD-10-CM

## 2020-01-01 DIAGNOSIS — J18.9 PNEUMONIA OF BOTH LOWER LOBES DUE TO INFECTIOUS ORGANISM: ICD-10-CM

## 2020-01-01 LAB
ALBUMIN SERPL BCP-MCNC: 2.5 G/DL (ref 3.5–5)
ALP SERPL-CCNC: 69 U/L (ref 46–116)
ALT SERPL W P-5'-P-CCNC: 10 U/L (ref 12–78)
ANION GAP SERPL CALCULATED.3IONS-SCNC: 10 MMOL/L (ref 4–13)
ANION GAP SERPL CALCULATED.3IONS-SCNC: 8 MMOL/L (ref 4–13)
ANISOCYTOSIS BLD QL SMEAR: PRESENT
AST SERPL W P-5'-P-CCNC: 10 U/L (ref 5–45)
ATRIAL RATE: 68 BPM
BACTERIA BLD CULT: NORMAL
BACTERIA BLD CULT: NORMAL
BASOPHILS # BLD MANUAL: 0 THOUSAND/UL (ref 0–0.1)
BASOPHILS NFR MAR MANUAL: 0 % (ref 0–1)
BILIRUB SERPL-MCNC: 0.39 MG/DL (ref 0.2–1)
BILIRUB UR QL STRIP: NEGATIVE
BUN SERPL-MCNC: 13 MG/DL (ref 5–25)
BUN SERPL-MCNC: 14 MG/DL (ref 5–25)
CALCIUM SERPL-MCNC: 7.8 MG/DL (ref 8.3–10.1)
CALCIUM SERPL-MCNC: 7.9 MG/DL (ref 8.3–10.1)
CHLORIDE SERPL-SCNC: 104 MMOL/L (ref 100–108)
CHLORIDE SERPL-SCNC: 104 MMOL/L (ref 100–108)
CLARITY UR: CLEAR
CO2 SERPL-SCNC: 22 MMOL/L (ref 21–32)
CO2 SERPL-SCNC: 24 MMOL/L (ref 21–32)
COLOR UR: YELLOW
CREAT SERPL-MCNC: 0.96 MG/DL (ref 0.6–1.3)
CREAT SERPL-MCNC: 1.05 MG/DL (ref 0.6–1.3)
DOHLE BOD BLD QL SMEAR: PRESENT
EOSINOPHIL # BLD MANUAL: 0.12 THOUSAND/UL (ref 0–0.4)
EOSINOPHIL NFR BLD MANUAL: 4 % (ref 0–6)
ERYTHROCYTE [DISTWIDTH] IN BLOOD BY AUTOMATED COUNT: 17.5 % (ref 11.6–15.1)
ERYTHROCYTE [DISTWIDTH] IN BLOOD BY AUTOMATED COUNT: 17.9 % (ref 11.6–15.1)
GFR SERPL CREATININE-BSD FRML MDRD: 67 ML/MIN/1.73SQ M
GFR SERPL CREATININE-BSD FRML MDRD: 74 ML/MIN/1.73SQ M
GLUCOSE SERPL-MCNC: 82 MG/DL (ref 65–140)
GLUCOSE SERPL-MCNC: 93 MG/DL (ref 65–140)
GLUCOSE UR STRIP-MCNC: NEGATIVE MG/DL
HCT VFR BLD AUTO: 24.6 % (ref 36.5–49.3)
HCT VFR BLD AUTO: 25.8 % (ref 36.5–49.3)
HGB BLD-MCNC: 8.1 G/DL (ref 12–17)
HGB BLD-MCNC: 8.2 G/DL (ref 12–17)
HGB UR QL STRIP.AUTO: NEGATIVE
KETONES UR STRIP-MCNC: NEGATIVE MG/DL
LACTATE SERPL-SCNC: 1 MMOL/L (ref 0.5–2)
LEUKOCYTE ESTERASE UR QL STRIP: NEGATIVE
LYMPHOCYTES # BLD AUTO: 0.03 THOUSAND/UL (ref 0.6–4.47)
LYMPHOCYTES # BLD AUTO: 1 % (ref 14–44)
MCH RBC QN AUTO: 31.9 PG (ref 26.8–34.3)
MCH RBC QN AUTO: 32.4 PG (ref 26.8–34.3)
MCHC RBC AUTO-ENTMCNC: 31.8 G/DL (ref 31.4–37.4)
MCHC RBC AUTO-ENTMCNC: 32.9 G/DL (ref 31.4–37.4)
MCV RBC AUTO: 100 FL (ref 82–98)
MCV RBC AUTO: 98 FL (ref 82–98)
METAMYELOCYTES NFR BLD MANUAL: 3 % (ref 0–1)
MONOCYTES # BLD AUTO: 0.71 THOUSAND/UL (ref 0–1.22)
MONOCYTES NFR BLD: 23 % (ref 4–12)
NEUTROPHILS # BLD MANUAL: 2.05 THOUSAND/UL (ref 1.85–7.62)
NEUTS BAND NFR BLD MANUAL: 22 % (ref 0–8)
NEUTS SEG NFR BLD AUTO: 44 % (ref 43–75)
NITRITE UR QL STRIP: NEGATIVE
NRBC BLD AUTO-RTO: 0 /100 WBCS
OVALOCYTES BLD QL SMEAR: PRESENT
P AXIS: 37 DEGREES
PH UR STRIP.AUTO: 6 [PH]
PLATELET # BLD AUTO: 84 THOUSANDS/UL (ref 149–390)
PLATELET # BLD AUTO: 93 THOUSANDS/UL (ref 149–390)
PLATELET # BLD AUTO: 98 THOUSANDS/UL (ref 149–390)
PLATELET BLD QL SMEAR: ADEQUATE
PMV BLD AUTO: 10.6 FL (ref 8.9–12.7)
PMV BLD AUTO: 10.8 FL (ref 8.9–12.7)
PMV BLD AUTO: 11.1 FL (ref 8.9–12.7)
POTASSIUM SERPL-SCNC: 3.6 MMOL/L (ref 3.5–5.3)
POTASSIUM SERPL-SCNC: 3.9 MMOL/L (ref 3.5–5.3)
PR INTERVAL: 160 MS
PROCALCITONIN SERPL-MCNC: 0.1 NG/ML
PROCALCITONIN SERPL-MCNC: 0.15 NG/ML
PROT SERPL-MCNC: 4.8 G/DL (ref 6.4–8.2)
PROT UR STRIP-MCNC: NEGATIVE MG/DL
QRS AXIS: 61 DEGREES
QRSD INTERVAL: 84 MS
QT INTERVAL: 450 MS
QTC INTERVAL: 478 MS
RBC # BLD AUTO: 2.5 MILLION/UL (ref 3.88–5.62)
RBC # BLD AUTO: 2.57 MILLION/UL (ref 3.88–5.62)
SARS-COV-2 RNA RESP QL NAA+PROBE: NEGATIVE
SODIUM SERPL-SCNC: 136 MMOL/L (ref 136–145)
SODIUM SERPL-SCNC: 136 MMOL/L (ref 136–145)
SP GR UR STRIP.AUTO: <=1.005 (ref 1–1.03)
T WAVE AXIS: 67 DEGREES
TOTAL CELLS COUNTED SPEC: 100
TOXIC GRANULES BLD QL SMEAR: PRESENT
TSH SERPL DL<=0.05 MIU/L-ACNC: 0.58 UIU/ML (ref 0.36–3.74)
UROBILINOGEN UR QL STRIP.AUTO: 0.2 E.U./DL
VARIANT LYMPHS # BLD AUTO: 3 %
VENTRICULAR RATE: 68 BPM
WBC # BLD AUTO: 2.31 THOUSAND/UL (ref 4.31–10.16)
WBC # BLD AUTO: 3.1 THOUSAND/UL (ref 4.31–10.16)

## 2020-01-01 PROCEDURE — 97167 OT EVAL HIGH COMPLEX 60 MIN: CPT

## 2020-01-01 PROCEDURE — 36415 COLL VENOUS BLD VENIPUNCTURE: CPT | Performed by: EMERGENCY MEDICINE

## 2020-01-01 PROCEDURE — 99232 SBSQ HOSP IP/OBS MODERATE 35: CPT | Performed by: HOSPITALIST

## 2020-01-01 PROCEDURE — 83605 ASSAY OF LACTIC ACID: CPT | Performed by: EMERGENCY MEDICINE

## 2020-01-01 PROCEDURE — 99305 1ST NF CARE MODERATE MDM 35: CPT | Performed by: FAMILY MEDICINE

## 2020-01-01 PROCEDURE — 99310 SBSQ NF CARE HIGH MDM 45: CPT | Performed by: NURSE PRACTITIONER

## 2020-01-01 PROCEDURE — 99309 SBSQ NF CARE MODERATE MDM 30: CPT | Performed by: NURSE PRACTITIONER

## 2020-01-01 PROCEDURE — 93005 ELECTROCARDIOGRAM TRACING: CPT

## 2020-01-01 PROCEDURE — 97116 GAIT TRAINING THERAPY: CPT

## 2020-01-01 PROCEDURE — 80048 BASIC METABOLIC PNL TOTAL CA: CPT | Performed by: INTERNAL MEDICINE

## 2020-01-01 PROCEDURE — 71045 X-RAY EXAM CHEST 1 VIEW: CPT

## 2020-01-01 PROCEDURE — 85049 AUTOMATED PLATELET COUNT: CPT | Performed by: INTERNAL MEDICINE

## 2020-01-01 PROCEDURE — 87040 BLOOD CULTURE FOR BACTERIA: CPT | Performed by: EMERGENCY MEDICINE

## 2020-01-01 PROCEDURE — 96360 HYDRATION IV INFUSION INIT: CPT

## 2020-01-01 PROCEDURE — 93010 ELECTROCARDIOGRAM REPORT: CPT | Performed by: INTERNAL MEDICINE

## 2020-01-01 PROCEDURE — 84145 PROCALCITONIN (PCT): CPT | Performed by: EMERGENCY MEDICINE

## 2020-01-01 PROCEDURE — 99442 PR PHYS/QHP TELEPHONE EVALUATION 11-20 MIN: CPT | Performed by: FAMILY MEDICINE

## 2020-01-01 PROCEDURE — 85007 BL SMEAR W/DIFF WBC COUNT: CPT | Performed by: EMERGENCY MEDICINE

## 2020-01-01 PROCEDURE — 87635 SARS-COV-2 COVID-19 AMP PRB: CPT | Performed by: EMERGENCY MEDICINE

## 2020-01-01 PROCEDURE — 85027 COMPLETE CBC AUTOMATED: CPT | Performed by: EMERGENCY MEDICINE

## 2020-01-01 PROCEDURE — 99306 1ST NF CARE HIGH MDM 50: CPT | Performed by: FAMILY MEDICINE

## 2020-01-01 PROCEDURE — 84443 ASSAY THYROID STIM HORMONE: CPT | Performed by: INTERNAL MEDICINE

## 2020-01-01 PROCEDURE — 81003 URINALYSIS AUTO W/O SCOPE: CPT | Performed by: EMERGENCY MEDICINE

## 2020-01-01 PROCEDURE — 85027 COMPLETE CBC AUTOMATED: CPT | Performed by: INTERNAL MEDICINE

## 2020-01-01 PROCEDURE — 99285 EMERGENCY DEPT VISIT HI MDM: CPT | Performed by: EMERGENCY MEDICINE

## 2020-01-01 PROCEDURE — 99316 NF DSCHRG MGMT 30 MIN+: CPT | Performed by: NURSE PRACTITIONER

## 2020-01-01 PROCEDURE — 99239 HOSP IP/OBS DSCHRG MGMT >30: CPT | Performed by: HOSPITALIST

## 2020-01-01 PROCEDURE — 99223 1ST HOSP IP/OBS HIGH 75: CPT | Performed by: HOSPITALIST

## 2020-01-01 PROCEDURE — 97163 PT EVAL HIGH COMPLEX 45 MIN: CPT

## 2020-01-01 PROCEDURE — 99309 SBSQ NF CARE MODERATE MDM 30: CPT | Performed by: FAMILY MEDICINE

## 2020-01-01 PROCEDURE — 99285 EMERGENCY DEPT VISIT HI MDM: CPT

## 2020-01-01 PROCEDURE — 80053 COMPREHEN METABOLIC PANEL: CPT | Performed by: EMERGENCY MEDICINE

## 2020-01-01 PROCEDURE — 84145 PROCALCITONIN (PCT): CPT | Performed by: INTERNAL MEDICINE

## 2020-01-01 PROCEDURE — 96361 HYDRATE IV INFUSION ADD-ON: CPT

## 2020-01-01 RX ORDER — MIRTAZAPINE 15 MG/1
15 TABLET, FILM COATED ORAL
Status: DISCONTINUED | OUTPATIENT
Start: 2020-01-01 | End: 2020-01-01 | Stop reason: HOSPADM

## 2020-01-01 RX ORDER — FAMOTIDINE 20 MG/1
20 TABLET, FILM COATED ORAL 2 TIMES DAILY
COMMUNITY
Start: 2020-01-01

## 2020-01-01 RX ORDER — METOPROLOL SUCCINATE 200 MG/1
TABLET, EXTENDED RELEASE ORAL DAILY
Qty: 90 TABLET | Refills: 2 | Status: SHIPPED | OUTPATIENT
Start: 2020-01-01 | End: 2020-01-01 | Stop reason: ALTCHOICE

## 2020-01-01 RX ORDER — METOPROLOL SUCCINATE 50 MG/1
50 TABLET, EXTENDED RELEASE ORAL DAILY
Qty: 30 TABLET | Refills: 6 | Status: SHIPPED | OUTPATIENT
Start: 2020-01-01

## 2020-01-01 RX ORDER — METOPROLOL SUCCINATE 50 MG/1
50 TABLET, EXTENDED RELEASE ORAL DAILY
Status: DISCONTINUED | OUTPATIENT
Start: 2020-01-01 | End: 2020-01-01 | Stop reason: HOSPADM

## 2020-01-01 RX ORDER — SODIUM CHLORIDE 9 MG/ML
75 INJECTION, SOLUTION INTRAVENOUS CONTINUOUS
Status: DISCONTINUED | OUTPATIENT
Start: 2020-01-01 | End: 2020-01-01 | Stop reason: HOSPADM

## 2020-01-01 RX ORDER — BENZONATATE 100 MG/1
200 CAPSULE ORAL 3 TIMES DAILY PRN
Status: DISCONTINUED | OUTPATIENT
Start: 2020-01-01 | End: 2020-01-01 | Stop reason: HOSPADM

## 2020-01-01 RX ORDER — VANCOMYCIN HYDROCHLORIDE 1 G/200ML
12.5 INJECTION, SOLUTION INTRAVENOUS ONCE
Status: COMPLETED | OUTPATIENT
Start: 2020-01-01 | End: 2020-01-01

## 2020-01-01 RX ORDER — MIRTAZAPINE 15 MG/1
TABLET, FILM COATED ORAL
COMMUNITY
Start: 2020-01-01

## 2020-01-01 RX ORDER — VANCOMYCIN HYDROCHLORIDE 1 G/200ML
15 INJECTION, SOLUTION INTRAVENOUS ONCE
Status: COMPLETED | OUTPATIENT
Start: 2020-01-01 | End: 2020-01-01

## 2020-01-01 RX ORDER — GUAIFENESIN 600 MG
1200 TABLET, EXTENDED RELEASE 12 HR ORAL EVERY 12 HOURS SCHEDULED
Status: DISCONTINUED | OUTPATIENT
Start: 2020-01-01 | End: 2020-01-01 | Stop reason: HOSPADM

## 2020-01-01 RX ORDER — BENZONATATE 200 MG/1
200 CAPSULE ORAL 3 TIMES DAILY PRN
Qty: 20 CAPSULE | Refills: 0 | Status: SHIPPED | OUTPATIENT
Start: 2020-01-01

## 2020-01-01 RX ORDER — PRAVASTATIN SODIUM 20 MG
20 TABLET ORAL
Status: DISCONTINUED | OUTPATIENT
Start: 2020-01-01 | End: 2020-01-01 | Stop reason: HOSPADM

## 2020-01-01 RX ORDER — AMLODIPINE BESYLATE 5 MG/1
1 TABLET ORAL
COMMUNITY
Start: 2014-12-11 | End: 2020-01-01 | Stop reason: ALTCHOICE

## 2020-01-01 RX ORDER — ASPIRIN 81 MG/1
162 TABLET, CHEWABLE ORAL DAILY
Status: DISCONTINUED | OUTPATIENT
Start: 2020-01-01 | End: 2020-01-01 | Stop reason: HOSPADM

## 2020-01-01 RX ORDER — FAMOTIDINE 20 MG/1
20 TABLET, FILM COATED ORAL 2 TIMES DAILY
Status: DISCONTINUED | OUTPATIENT
Start: 2020-01-01 | End: 2020-01-01 | Stop reason: HOSPADM

## 2020-01-01 RX ORDER — CHLORAL HYDRATE 500 MG
1000 CAPSULE ORAL DAILY
Status: DISCONTINUED | OUTPATIENT
Start: 2020-01-01 | End: 2020-01-01 | Stop reason: HOSPADM

## 2020-01-01 RX ORDER — MELATONIN
1000 DAILY
Status: DISCONTINUED | OUTPATIENT
Start: 2020-01-01 | End: 2020-01-01 | Stop reason: HOSPADM

## 2020-01-01 RX ORDER — PRAVASTATIN SODIUM 20 MG
TABLET ORAL
COMMUNITY
Start: 2014-12-11

## 2020-01-01 RX ORDER — METOPROLOL SUCCINATE 200 MG/1
1 TABLET, EXTENDED RELEASE ORAL
COMMUNITY
Start: 2014-12-11 | End: 2020-01-01 | Stop reason: ALTCHOICE

## 2020-01-01 RX ORDER — FLUTICASONE PROPIONATE 50 MCG
2 SPRAY, SUSPENSION (ML) NASAL DAILY
Status: DISCONTINUED | OUTPATIENT
Start: 2020-01-01 | End: 2020-01-01 | Stop reason: HOSPADM

## 2020-01-01 RX ORDER — PANTOPRAZOLE SODIUM 20 MG/1
TABLET, DELAYED RELEASE ORAL
COMMUNITY
Start: 2020-01-01

## 2020-01-01 RX ORDER — FLUTICASONE PROPIONATE 50 MCG
SPRAY, SUSPENSION (ML) NASAL
COMMUNITY
Start: 2020-01-01

## 2020-01-01 RX ORDER — PANTOPRAZOLE SODIUM 20 MG/1
20 TABLET, DELAYED RELEASE ORAL
Status: DISCONTINUED | OUTPATIENT
Start: 2020-01-01 | End: 2020-01-01 | Stop reason: HOSPADM

## 2020-01-01 RX ADMIN — Medication 1000 MG: at 09:24

## 2020-01-01 RX ADMIN — FAMOTIDINE 20 MG: 20 TABLET ORAL at 18:25

## 2020-01-01 RX ADMIN — SODIUM CHLORIDE 75 ML/HR: 0.9 INJECTION, SOLUTION INTRAVENOUS at 05:08

## 2020-01-01 RX ADMIN — PRAVASTATIN SODIUM 20 MG: 20 TABLET ORAL at 17:05

## 2020-01-01 RX ADMIN — PANTOPRAZOLE SODIUM 20 MG: 20 TABLET, DELAYED RELEASE ORAL at 05:34

## 2020-01-01 RX ADMIN — CEFEPIME HYDROCHLORIDE 2000 MG: 2 INJECTION, POWDER, FOR SOLUTION INTRAVENOUS at 00:37

## 2020-01-01 RX ADMIN — SODIUM CHLORIDE 75 ML/HR: 0.9 INJECTION, SOLUTION INTRAVENOUS at 08:04

## 2020-01-01 RX ADMIN — PRAVASTATIN SODIUM 20 MG: 20 TABLET ORAL at 18:24

## 2020-01-01 RX ADMIN — GUAIFENESIN 1200 MG: 600 TABLET, EXTENDED RELEASE ORAL at 22:13

## 2020-01-01 RX ADMIN — GUAIFENESIN 1200 MG: 600 TABLET, EXTENDED RELEASE ORAL at 10:35

## 2020-01-01 RX ADMIN — MELATONIN 1000 UNITS: at 09:24

## 2020-01-01 RX ADMIN — FAMOTIDINE 20 MG: 20 TABLET ORAL at 09:29

## 2020-01-01 RX ADMIN — FAMOTIDINE 20 MG: 20 TABLET ORAL at 09:24

## 2020-01-01 RX ADMIN — FLUTICASONE PROPIONATE 2 SPRAY: 50 SPRAY, METERED NASAL at 09:30

## 2020-01-01 RX ADMIN — METOPROLOL SUCCINATE 50 MG: 50 TABLET, EXTENDED RELEASE ORAL at 09:24

## 2020-01-01 RX ADMIN — SODIUM CHLORIDE 1000 ML: 0.9 INJECTION, SOLUTION INTRAVENOUS at 09:58

## 2020-01-01 RX ADMIN — FAMOTIDINE 20 MG: 20 TABLET ORAL at 17:06

## 2020-01-01 RX ADMIN — Medication 1000 MG: at 09:28

## 2020-01-01 RX ADMIN — GUAIFENESIN 1200 MG: 600 TABLET, EXTENDED RELEASE ORAL at 09:29

## 2020-01-01 RX ADMIN — ASPIRIN 81 MG 162 MG: 81 TABLET ORAL at 09:29

## 2020-01-01 RX ADMIN — ENOXAPARIN SODIUM 40 MG: 40 INJECTION SUBCUTANEOUS at 09:29

## 2020-01-01 RX ADMIN — VANCOMYCIN HYDROCHLORIDE 1000 MG: 1 INJECTION, SOLUTION INTRAVENOUS at 12:45

## 2020-01-01 RX ADMIN — CEFEPIME HYDROCHLORIDE 2000 MG: 2 INJECTION, POWDER, FOR SOLUTION INTRAVENOUS at 12:15

## 2020-01-01 RX ADMIN — SODIUM CHLORIDE 75 ML/HR: 0.9 INJECTION, SOLUTION INTRAVENOUS at 17:05

## 2020-01-01 RX ADMIN — MIRTAZAPINE 15 MG: 15 TABLET, FILM COATED ORAL at 21:11

## 2020-01-01 RX ADMIN — FLUTICASONE PROPIONATE 2 SPRAY: 50 SPRAY, METERED NASAL at 09:28

## 2020-01-01 RX ADMIN — ASPIRIN 81 MG 162 MG: 81 TABLET ORAL at 09:24

## 2020-01-01 RX ADMIN — METOPROLOL SUCCINATE 50 MG: 50 TABLET, EXTENDED RELEASE ORAL at 09:29

## 2020-01-01 RX ADMIN — SODIUM CHLORIDE 75 ML/HR: 0.9 INJECTION, SOLUTION INTRAVENOUS at 18:29

## 2020-01-01 RX ADMIN — MIRTAZAPINE 15 MG: 15 TABLET, FILM COATED ORAL at 22:14

## 2020-01-01 RX ADMIN — PANTOPRAZOLE SODIUM 20 MG: 20 TABLET, DELAYED RELEASE ORAL at 05:07

## 2020-01-01 RX ADMIN — ENOXAPARIN SODIUM 40 MG: 40 INJECTION SUBCUTANEOUS at 09:24

## 2020-01-01 RX ADMIN — VANCOMYCIN HYDROCHLORIDE 1000 MG: 1 INJECTION, SOLUTION INTRAVENOUS at 00:38

## 2020-03-23 NOTE — PROGRESS NOTES
[address]  [address] [facility]  [phone]    [facility]  HISTORY & PHYSICAL        NAME: Malaika Mathews  AGE: [de-identified] y o  SEX: male  : 1940  DATE OF ENCOUNTER: 20  POS: 31 (SNF)  PCP: Saad Ellis MD    Chief Complaint     Seen and examined today for admission to short term rehabilitation unit at [facility]  History of Present Illness     [de-identified]year-old gentleman with chronic kidney disease stage III with baseline creatinine 1 4-1 6, CAD status post CABG x3 in , hyperlipidemia, hypertension  Of recently diagnosed esophageal cancer (2020), currently undergoing chemotherapy and radiation treatment and following with hemato Oncology regularly  Patient had presented to radiation oncology on 2020 for his regular treatment and was noted to have a fever to 100 1°  Patient had complained of feeling very weak and having very poor appetite associated with weight loss  He had also reported a chronic cough productive of whitish phlegm  He denied any shortness of breath at that time  From radiation oncology he was sent to the ED where he was found to have leukocytosis to 22,000  Initial workup included a chest x-ray that showed airspace opacity within the lung bases [facility] physician had contacted his hemato oncologist who advised that patient be admitted for antibiotic therapy and further evaluation  Per records reviewed, patient has had very poor appetite with weight loss and has been discussing the possibility of a feeding tube with his oncologist   Patient was admitted with a diagnosis of sepsis felt to be secondary to pneumonia and treated with IV antibiotics  During his hospitalization he was evaluated by surgical team for possibility of feeding tube placement  This was not performed and will likely be planned for as outpatient procedure    Once considered medically stable, patient was discharged to [facility] for short-term rehab  Today, patient seen and examined for admission to short-term rehab unit  He is sitting comfortably in chair  Reports feeling very tired but overall better  He states he is tired of this situation, ever since he started chemotherapy he has been feeling poorly  Patient was previously living at home with her dad [place] with his wife and was independent with all ADLs  At this time, he complains of cough productive of scant yellow or white sputum and feeling weak  Denies any fever/chills, chest pain/shortness of breath, abdominal discomfort, nausea/vomiting, diarrhea/constipation or dysuria  Review of Systems     Review of Systems   Constitutional: Positive for activity change, appetite change and fatigue  Negative for unexpected weight change  HENT: Negative for congestion, dental problem, hearing loss, mouth sores, trouble swallowing and voice change  Eyes: Negative for visual disturbance  Respiratory: Positive for cough  Negative for chest tightness and shortness of breath  Cardiovascular: Negative for chest pain, palpitations and leg swelling  Gastrointestinal: Negative for abdominal distention, abdominal pain, constipation, diarrhea, nausea and vomiting  Genitourinary: Negative for dysuria  Musculoskeletal: Negative for arthralgias, back pain and gait problem  Skin: Negative for color change, pallor, rash and wound  Neurological: Negative for weakness and light-headedness  All other systems reviewed and are negative        History     Allergies   Allergen Reactions    Amoxicillin      UNKNOWN REACTION       Past Medical History:   Diagnosis Date    Chronic kidney disease     Coronary artery disease     Hyperlipidemia     Hypertension      Past Surgical History:   Procedure Laterality Date    APPENDECTOMY      BACK SURGERY  1981    CORONARY ARTERY BYPASS GRAFT  2003       Family History: non-contributory  Social History     Tobacco Use  Smoking status: Former Smoker    Smokeless tobacco: Never Used   Substance Use Topics    Alcohol use: No    Drug use: No         He lives in her due to [facility] apartments with his wife  Patient states prior to this admission he was independent with ADLs  For the last couple of months he had not been driving  But was ambulating with a walker intermittently  Objective   Vital Signs   BP:   108/56    HR:  74     T:  98 6°     RR:  16     O2Sat:  94% on RA      W:  165 2 lb    Physical Exam   Constitutional: He is oriented to person, place, and time  He appears well-developed  No distress  HENT:   Head: Normocephalic and atraumatic  Right Ear: External ear normal    Left Ear: External ear normal    Mouth/Throat: Oropharynx is clear and moist  No oropharyngeal exudate  Eyes: Pupils are equal, round, and reactive to light  Conjunctivae and EOM are normal  No scleral icterus  Neck: Neck supple  No JVD present  No thyromegaly present  Cardiovascular: Normal rate, regular rhythm and normal heart sounds  No murmur heard  Pulmonary/Chest: Effort normal  No respiratory distress  Course respiratory sounds noted throughout, more prominent on bilateral bases  Abdominal: Soft  Bowel sounds are normal  He exhibits no distension  There is no tenderness  Musculoskeletal: Normal range of motion  He exhibits no edema, tenderness or deformity  Neurological: He is alert and oriented to person, place, and time  He has normal reflexes  No cranial nerve deficit  Skin: Skin is warm and dry  No rash noted  He is not diaphoretic  No erythema  No pallor  Psychiatric: He has a normal mood and affect   His behavior is normal  Judgment and thought content normal          Pertinent Laboratory/Diagnostic Studies:   03/19/2020:  WBC 5 3, HB 9 5, HCT 29 1,   03/19/2020:  , K 4 7, Cl 105, CO2 28, BUN 11, creatinine 0 86,     Current Medications   Omega 3 1000 mg daily  Amlodipine 5 mg daily  Mirtazapine 15 mg q h s  Cefdinir 300 mg b i d  through 03/30/2020  Ipratropium bromide solution 1 spray in both nostrils daily p r n  Famotidine 20 mg b i d  Metoprolol succinate 200 mg daily  Cholecalciferol tablet 2000 units daily  Fluticasone propionate 1 spray in both nostrils daily  Aspirin 162 mg daily  Pravastatin 20 mg daily  Atrovent HFA 1 puff 3 times a day  Ventolin HFA 1 puff t i d  p r n  Bowel regimen  Tylenol p r n  And  All medications reviewed and updated in [facility] EMR/Chart  Assessment and Plan     [facility] ([facility] )  · On admission to the hospital patient was febrile with significant leukocytosis and evidence of pneumonia  · He was admitted to the hospital for IV antibiotic therapy and hydration  · He was discharged to rehab facility with oral antibiotic (cefdinir) through 03/30/2020  · Will check CBC to continue to monitor WBC trend  · Monitor for fever and hemodynamic instability  · Encourage oral fluids  Moderate protein-calorie malnutrition ([facility] )  · Patient with underlying esophageal malignancy and poor appetite  · He states he has lost some weight in the last couple of months but is unable to quantify how much  · He has been discussing possibility of feeding tube placement with his oncologist   · During this hospitalization he was evaluated by surgical team and plan is to evaluate for feeding tube placement as an outpatient  · Monitor weekly weights  · Encourage oral intake  · Start house shakes  Generalized weakness  · In the setting off underlying malignancy and recent hospitalization due to sepsis  · Patient is very deconditioned and feeling weak  · PT/OT to evaluate and treat as appropriate  Anemia of chronic disease  · Unknown baseline  · Hemoglobin stable around 9 5  · In the setting of underlying malignancy and CKD 3  · Will check CBC to monitor  Essential hypertension  · Blood pressure currently on the lower side    · Continue amlodipine 5 mg daily and metoprolol succinate 200 mg daily  · Will continue to monitor closely and adjust medications as needed  COPD (chronic obstructive pulmonary disease) ([facility] )  · Patient reportedly had an admission back in January 2020 due to COPD exacerbation  · Currently without exacerbation  · He does have chronic cough and recently treated for pneumonia  · We will continue to monitor, continue Atrovent, Ventolin ipratropium  CKD (chronic kidney disease) stage 3, GFR 30-59 ml/min  · It is uncertain to me what his creatinine baseline is  · Creatinine was last checked 03/19/2020 and stable at 0 86  · Will continue to avoid hypotension and nephrotoxins as possible and monitor renal function  Jb Blankenship MD  Geriatric Medicine  03/25/20    Please note:  Voice-recognition software may have been used in the preparation of this document  Occasional wrong word or "sound-alike" substitutions may have occurred due to the inherent limitations of voice recognition software  Interpretation should be guided by context

## 2020-03-25 PROBLEM — R53.83 MALAISE AND FATIGUE: Status: ACTIVE | Noted: 2020-01-01

## 2020-03-25 PROBLEM — J44.9 COPD (CHRONIC OBSTRUCTIVE PULMONARY DISEASE) (HCC): Status: ACTIVE | Noted: 2020-01-01

## 2020-03-25 PROBLEM — E44.0 MODERATE PROTEIN-CALORIE MALNUTRITION (HCC): Status: ACTIVE | Noted: 2020-01-01

## 2020-03-25 PROBLEM — A41.9 SEPSIS (HCC): Status: ACTIVE | Noted: 2020-01-01

## 2020-03-25 PROBLEM — R53.1 GENERALIZED WEAKNESS: Status: ACTIVE | Noted: 2020-01-01

## 2020-03-25 PROBLEM — D63.8 ANEMIA OF CHRONIC DISEASE: Status: ACTIVE | Noted: 2020-01-01

## 2020-03-25 PROBLEM — J18.9 PNEUMONIA DUE TO INFECTIOUS ORGANISM: Status: ACTIVE | Noted: 2020-01-01

## 2020-03-25 PROBLEM — R50.9 FEVER: Status: ACTIVE | Noted: 2020-01-01

## 2020-03-25 PROBLEM — R53.81 MALAISE AND FATIGUE: Status: ACTIVE | Noted: 2020-01-01

## 2020-03-26 NOTE — ASSESSMENT & PLAN NOTE
· Patient reportedly had an admission back in January 2020 due to COPD exacerbation  · Currently without exacerbation  · He does have chronic cough and recently treated for pneumonia  · We will continue to monitor, continue Atrovent, Ventolin ipratropium

## 2020-03-26 NOTE — ASSESSMENT & PLAN NOTE
· On admission to the hospital patient was febrile with significant leukocytosis and evidence of pneumonia  · He was admitted to the hospital for IV antibiotic therapy and hydration  · He was discharged to rehab facility with oral antibiotic (cefdinir) through 03/30/2020  · Will check CBC to continue to monitor WBC trend  · Monitor for fever and hemodynamic instability  · Encourage oral fluids

## 2020-03-26 NOTE — ASSESSMENT & PLAN NOTE
· Patient with underlying esophageal malignancy and poor appetite  · He states he has lost some weight in the last couple of months but is unable to quantify how much  · He has been discussing possibility of feeding tube placement with his oncologist   · During this hospitalization he was evaluated by surgical team and plan is to evaluate for feeding tube placement as an outpatient  · Monitor weekly weights  · Encourage oral intake  · Start house shakes

## 2020-03-26 NOTE — ASSESSMENT & PLAN NOTE
· Patient recently discharged from the hospital after being treated for sepsis secondary to pneumonia  · Received IV antibiotics during hospitalization and was transitioned to oral cefdinir through 03/30/2020  · Patient with worsening shortness of breath this AM as per nursing staff  · Will follow up on CBC and BMP drawn today when results become available  · For sake of completeness, will check repeat Chest Xray, Flu/RSV PCR as well as COVID 19

## 2020-03-26 NOTE — ASSESSMENT & PLAN NOTE
· Unknown baseline  · Hemoglobin stable around 9 5  · In the setting of underlying malignancy and CKD 3  · Will check CBC to monitor

## 2020-03-26 NOTE — ASSESSMENT & PLAN NOTE
· In the setting off underlying malignancy and recent hospitalization due to sepsis  · Patient is very deconditioned and feeling weak  · PT/OT to evaluate and treat as appropriate

## 2020-03-26 NOTE — ASSESSMENT & PLAN NOTE
· Blood pressure today is stable  · Will continue amlodipine and metoprolol at current doses  · Review BMP results when available

## 2020-03-26 NOTE — ASSESSMENT & PLAN NOTE
· It is uncertain to me what his creatinine baseline is  · Creatinine was last checked 03/19/2020 and stable at 0 86  · Will continue to avoid hypotension and nephrotoxins as possible and monitor renal function

## 2020-03-26 NOTE — ASSESSMENT & PLAN NOTE
· Unable to provide further details  · States she feels a little better than in the morning  · Still complaining of feeling tired and fatigued  · Continue supportive care

## 2020-03-26 NOTE — ASSESSMENT & PLAN NOTE
· Patient has had 2 febrile episodes since last night to 100 0   · He did complain of worsening malaise and shortness of breath this morning as per nursing staff  · Patient was recently discharged from the hospital after being treated for sepsis secondary to pneumonia  · Given persistent fever despite antibiotic therapy on board, and for sake of completeness, we will order flu/RSV PCR as well as COVID 19    · The patient does have underlying esophageal cancer, undergoing chemotherapy and has a previous history of neutropenic fever back in January 2020  · Will notify his oncologist of recurrent or persistent fever  · CBC and BMP done this morning, results are pending  · Continue to monitor closely, start patient on isolation precautions

## 2020-03-26 NOTE — PROGRESS NOTES
[facility]  [address] [place] [address]  [phone]    [facility]  PROGRESS NOTE        NAME: Stanley Connelly  AGE: [de-identified] y o  SEX: male  :  1940  DATE OF ENCOUNTER:   2020  POS: 31 (SNF)    Assessment and Plan     Malaise and fatigue  · Unable to provide further details  · States she feels a little better than in the morning  · Still complaining of feeling tired and fatigued  · Continue supportive care  Fever  · Patient has had 2 febrile episodes since last night to 100 0   · He did complain of worsening malaise and shortness of breath this morning as per nursing staff  · Patient was recently discharged from the hospital after being treated for sepsis secondary to pneumonia  · Given persistent fever despite antibiotic therapy on board, and for sake of completeness, we will order flu/RSV PCR as well as COVID 19    · The patient does have underlying esophageal cancer, undergoing chemotherapy and has a previous history of neutropenic fever back in 2020  · Will notify his oncologist of recurrent or persistent fever  · CBC and BMP done this morning, results are pending  · Continue to monitor closely, start patient on isolation precautions  Essential hypertension  · Blood pressure today is stable  · Will continue amlodipine and metoprolol at current doses  · Review BMP results when available  Pneumonia due to infectious organism  · Patient recently discharged from the hospital after being treated for sepsis secondary to pneumonia  · Received IV antibiotics during hospitalization and was transitioned to oral cefdinir through 2020  · Patient with worsening shortness of breath this AM as per nursing staff  · Will follow up on CBC and BMP drawn today when results become available  · For sake of completeness, will check repeat Chest Xray, Flu/RSV PCR as well as COVID 19  Discussed with patient at length   He wishes to remain a full code and is open to rehospitalization if necessary  I have offered to call his wife for update/discussion of plan, he states "it is not necessary"  Grace Pritchard MD  Geriatric Medicine  03/25/2020       Chief Complaint   Patient seen and examined for follow up on chronic conditions  History of Present Illness     Patient is an 79-year-old gentleman recently admitted to [facility] short-term rehab unit after hospitalization secondary to sepsis in the setting of pneumonia  Patient received IV antibiotics during hospitalization and was eventually transitioned to p o  cefdinir through 03/30/2020  I came to evaluate patient today per nurse's request due to concern for patient's overall decline  Per nurses, earlier this morning patient had been complaining of shortness of breath, worsening fatigue, generalized malaise  He had spiked a fever last night as well as this morning to 100 0°  Upon my evaluation, patient is sitting comfortably in chair, moist cough was noted  He reports feeling much better now than he did in the morning but states he is still tired  Denies shortness of breath at rest but becomes short of breath with exertion  He complains of chills but has not felt febrile  States his appetite is very poor and he is trying to force himself to eat  Denies any chest pain, abdominal discomfort, nausea/vomiting, diarrhea/constipation or dysuria  The following portions of the patient's history were reviewed and updated as appropriate: allergies, current medications, past family history, past medical history, past social history, past surgical history and problem list     Review of Systems     A complete review of systems was performed  All negative, except as per HPI      History     Past Medical History:   Diagnosis Date    Chronic kidney disease     Coronary artery disease     Hyperlipidemia     Hypertension      Allergies   Allergen Reactions    Amoxicillin UNKNOWN REACTION       Objective     Vital Signs  BP:     125/63       HR:  69 T:  100 0°    RR:  20 O2Sat:  95% on RA W:  165 lb    Physical Exam  GEN: NAD  Non-toxic appearing  Looks chronically ill, deconditioned and frail  Pale  HEENT: NC/AT  LUBA  EIOMI  Oropharynx semi dry  No oral lesions  CV: S1, S2   RRR, regular rate  No murmur appreciated  PULM: Non-labored respirations  Coarse breath sounds bilaterally, more prominent on bilateral bases  ABD: Soft, NT/ND  BSx4  EXT: No peripheral edema  NEURO:  Awake, alert and oriented x 3  Pertinent Laboratory/Diagnostic Studies         Current Medications     Current Medications Reviewed and updated in EMR  Monthly orders reviewed and signed in chart  Please note:  Voice-recognition software may have been used in the preparation of this document  Occasional wrong word or "sound-alike" substitutions may have occurred due to the inherent limitations of voice recognition software  Interpretation should be guided by context

## 2020-03-26 NOTE — ASSESSMENT & PLAN NOTE
· Blood pressure currently on the lower side  · Continue amlodipine 5 mg daily and metoprolol succinate 200 mg daily  · Will continue to monitor closely and adjust medications as needed

## 2020-03-27 NOTE — PROGRESS NOTES
[address] [address]  [phone]  [address] [place] [address]   Progress Note  POS 31      NAME: Vivienne Ball  AGE: [de-identified] y o  SEX: male  :  1940  DATE OF ENCOUNTER: 2020    Chief Complaint   Patient seen and examined for Left lower lobe pneumonia    History of Present Illness     [de-identified]year old male resident seen and examined today per nursing request for fever and lethargy this morning  He is s/p hospitalization secondary to sepsis in the setting of pneumonia  He received IV antibiotics, Vancomycin and Cefepime during hospitalization and is now transitioned to PO Cefdinir through 2020  Patient was seen and examined yesterday by the physician for increase sob, worsening fatigue and generalized malaise  A chest x-ray, COVID-19 and Flu A/B and RSV PCR was ordered  His chest x-ray report reveals modest left lower lobe pneumonia, his FLU A/B and RSV tests were negative and the COVID-19 PCR is pending  He has been spiking fevers intermittently for the past several days  This morning his fever was reported to be 101 9  Upon examination, he states that he feels very weak and tired  He is coughing frequently yellowish/brown colored sputum  He states that he has a decreased appetite and he is not drinking very much  He denies chest pain, sob, abdominal pain, nausea, vomiting, diarrhea, headache, dizziness or visual disturbance  The following portions of the patient's history were reviewed and updated as appropriate: allergies, current medications, past family history, past medical history, past social history, past surgical history and problem list     Review of Systems     A review of systems was performed  All negative, except as per HPI      History     Past Medical History:   Diagnosis Date    Chronic kidney disease     Coronary artery disease     Hyperlipidemia     Hypertension      Past Surgical History:   Procedure Laterality Date    APPENDECTOMY  BACK SURGERY  1981    CORONARY ARTERY BYPASS GRAFT  2003     Family History   Problem Relation Age of Onset    Heart disease Mother     Heart disease Father     Multiple sclerosis Sister      Social History     Socioeconomic History    Marital status: /Civil Union     Spouse name: Not on file    Number of children: Not on file    Years of education: Not on file    Highest education level: Not on file   Occupational History    Occupation: RETIRED   Social Needs    Financial resource strain: Not on file    Food insecurity:     Worry: Not on file     Inability: Not on file    Transportation needs:     Medical: Not on file     Non-medical: Not on file   Tobacco Use    Smoking status: Former Smoker    Smokeless tobacco: Never Used   Substance and Sexual Activity    Alcohol use: No    Drug use: No    Sexual activity: Not on file   Lifestyle    Physical activity:     Days per week: Not on file     Minutes per session: Not on file    Stress: Not on file   Relationships    Social connections:     Talks on phone: Not on file     Gets together: Not on file     Attends [employer] service: Not on file     Active member of club or organization: Not on file     Attends meetings of clubs or organizations: Not on file     Relationship status: Not on file    Intimate partner violence:     Fear of current or ex partner: Not on file     Emotionally abused: Not on file     Physically abused: Not on file     Forced sexual activity: Not on file   Other Topics Concern    Not on file   Social History Narrative    Not on file     Allergies   Allergen Reactions    Amoxicillin      UNKNOWN REACTION       Objective     Vital Signs  BP: 129/55      HR: 78  T:101 9    RR: 20 O2Sat: 93% ra W: 165 0 LBS  General: NAD, Non-toxic appearing, thin and frail  Oral: Oropharynx Moist and Clear  Neck: Supple, +ROM  CV: S1, S2, normal rate, regular rhythm, no murmur appreciated  Pulmonary: Left lower lobe rales, + Rhonchi throughout all lung fields  Abdominal:BS + x4 in all quadrants, soft, no mass, no tenderness  Extremities: No edema, +ROM, +Strength  Skin: Warm, Dry, no lesions, no rash, no erythema present, no ecchymosis present  Psych: Alert and oriented times 3, no mood, no affect, good judgement    Pertinent Laboratory/Diagnostic Studies:  03/19/2020: WBC 5 3, H/H  9 5/29 2, PLATELET COUNT 358, , K 4 7, , CO2 28, BUN 11 CREATININE 0 86, GLUCOSE 100      Current Medications     Current Medications Reviewed and updated in Nursing Home EMR      Assessment and Plan     Pneumonia due to infectious organism  - Chest x-ray reveals modest left lower lobe pneumonia  - Will order stat CBC with diff and CMP   - IV fluids ordered, NSS at 75ml/hr   - Continue tylenol for fever  - Continue inhalers and Mucinex  - Monitor vital signs frequently with SpO2, keep oxygen saturation greater than 92%  - Sputum Culture ordered  - Continue PO Cefnidir    Malaise and fatigue  - Multifactorial in the setting of acute pneumonia and chronic co-morbidities  - Will order IV fluids for hydration  - Encourage PO intake as tolerated  - Continue supportive care    Fever  - Multiple intermittent febrile episodes reported, TMax temp of 101 9  - CXR still is showing modest LLL pneumonia  - FLU A/B and RSV PCR negative  - COVID-19 PCR pending  - CBC with diff and BMP pending  - Will consider IV antibiotics if Leukocytosis present  - Continue PO Cefdinir    Essential hypertension  - BP well controlled  - Continue amlodipine and metoprolol at current doses  - CMP ordered  to monitor electrolytes and renal function      [facility]  03/26/2020

## 2020-03-27 NOTE — ASSESSMENT & PLAN NOTE
- Chest x-ray reveals modest left lower lobe pneumonia  - Will order stat CBC with diff and CMP   - IV fluids ordered, NSS at 75ml/hr   - Continue tylenol for fever  - Continue inhalers and Mucinex  - Monitor vital signs frequently with SpO2, keep oxygen saturation greater than 92%  - Sputum Culture ordered  - Continue PO Cefnidir

## 2020-03-27 NOTE — ASSESSMENT & PLAN NOTE
- Multiple intermittent febrile episodes reported, TMax temp of 101 9  - CXR still is showing modest LLL pneumonia  - FLU A/B and RSV PCR negative  - COVID-19 PCR pending  - CBC with diff and BMP pending  - Will consider IV antibiotics if Leukocytosis present  - Continue PO Cefdinir

## 2020-03-27 NOTE — ASSESSMENT & PLAN NOTE
- BP well controlled  - Continue amlodipine and metoprolol at current doses  - CMP ordered  to monitor electrolytes and renal function

## 2020-03-27 NOTE — ASSESSMENT & PLAN NOTE
- Multifactorial in the setting of acute pneumonia and chronic co-morbidities  - Will order IV fluids for hydration  - Encourage PO intake as tolerated  - Continue supportive care

## 2020-03-28 NOTE — PROGRESS NOTES
[facility]  [address]  [id number]  ([address] [facility]) [address] [facility]   Progress Note  POS       NAME: Magdi Villanueva  AGE: [de-identified] y o  SEX: male  :  1940  DATE OF ENCOUNTER: 3/27/2020    Chief Complaint   Patient seen and examined for follow up on pneumonia    History of Present Illness     [de-identified]year old male patient seen and examined today to follow-up on left lower lobe pneumonia and to review lab results  He is s/p hospitalization secondary to sepsis in the setting of pneumonia  He received IV antibiotics, Vancomycin and Cefepime during hospitalization and has now transitioned to PO Cefdinir through 2020  He was seen and examined for the past several days due to persistent elevated temperatures, weakness and lethargy  A chest x-ray showed modest left lower lobe congestion, his FLU A/B & RSV pcr is negative  COVID19 PCR is pending  He received IV fluids with significant improvement  His temperature has not spiked since yesterday, he is running low grade temperatures  He is still coughing and bringing up yellow/tan colored sputum  Sputum culture results are pending  Upon examination, he is sitting in his chair, calm, cooperative and in not acute distress  He states that he is feeling better since the IV fluids have been started  He denies chest pain, sob, abdominal pain, nausea, vomiting, diarrhea, headache, dizziness or blurred vision  The following portions of the patient's history were reviewed and updated as appropriate: allergies, current medications, past family history, past medical history, past social history, past surgical history and problem list     Review of Systems     A review of systems was performed  All negative, except as per HPI      History     Past Medical History:   Diagnosis Date    Chronic kidney disease     Coronary artery disease     Hyperlipidemia     Hypertension      Past Surgical History:   Procedure Laterality Date    APPENDECTOMY      BACK SURGERY  1981    CORONARY ARTERY BYPASS GRAFT  2003     Family History   Problem Relation Age of Onset    Heart disease Mother     Heart disease Father     Multiple sclerosis Sister      Social History     Socioeconomic History    Marital status: /Civil Union     Spouse name: Not on file    Number of children: Not on file    Years of education: Not on file    Highest education level: Not on file   Occupational History    Occupation: RETIRED   Social Needs    Financial resource strain: Not on file    Food insecurity:     Worry: Not on file     Inability: Not on file    Transportation needs:     Medical: Not on file     Non-medical: Not on file   Tobacco Use    Smoking status: Former Smoker    Smokeless tobacco: Never Used   Substance and Sexual Activity    Alcohol use: No    Drug use: No    Sexual activity: Not on file   Lifestyle    Physical activity:     Days per week: Not on file     Minutes per session: Not on file    Stress: Not on file   Relationships    Social connections:     Talks on phone: Not on file     Gets together: Not on file     Attends Faith service: Not on file     Active member of club or organization: Not on file     Attends meetings of clubs or organizations: Not on file     Relationship status: Not on file    Intimate partner violence:     Fear of current or ex partner: Not on file     Emotionally abused: Not on file     Physically abused: Not on file     Forced sexual activity: Not on file   Other Topics Concern    Not on file   Social History Narrative    Not on file     Allergies   Allergen Reactions    Amoxicillin      UNKNOWN REACTION       Objective     Vital Signs  BP: 105/55      HR:72   T: 99 6    RR: 20 O2Sat: 93% RA W: 165 lbs  General: NAD, Well Nourished, Well Developed  Oral: Oropharynx Moist and Clear  Neck: Supple, +ROM  CV: S1, S2, normal rate, regular rhythm, no murmur appreciated  Pulmonary: Lung sounds clear to air, Left lower lobe rales present, rhonchi, rales  Abdominal:BS + x4 in all quadrants, soft, no mass, no tenderness  Extremities: No edema, +ROM, +Strength  Skin: Warm, Dry, no lesions, no rash, no erythema present, no ecchymosis present  Neurological: CN 2-12 intact, PERRLA  Psych: Alert and oriented times 3, no mood, no affect, good judgement    Pertinent Laboratory/Diagnostic Studies:  03/26/2020: Glucose 103, BUN 15, Creatinine 1 10, Chloride 102, CO2 27, Sodium 135, Potassium 4 1, H/H 10 4/31 0, Platelet Count  726, WBC 9 8      Current Medications     Current Medications Reviewed and updated in Nursing Home EMR      Assessment and Plan     Pneumonia due to infectious organism  - Chest xray reveals modest left lower lobe pneumonia  - Blood work reviewed, no leukocytosis present, WBC 9 8  - Continue IV fluids, will decrease rate to 60 ml/hr  - Continue inhalers and Mucinex  - Monitor vital signs with SpO2, keep oxygen saturation greater than 92%  - Sputum culture pending  - Continue PO Cefdinir    Generalized weakness  - Multifactorial related to underlying malignancy, LLL pneumonia and recent hospitalization due to sepsis  - Encourage PO intake  - Assist with transfers and ADL's  - PT/OT to continue once medically stable    Anemia of chronic disease  - Stable, hemoglobin increased from 9 5 to 10 4  - Will continue to monitor CBC    Fever  - Improved with IV fluids  - Will continue IV fluids, decrease rate to 60 ml/hr  - Continue Tylenol for fever/pain  - Continue PO Cefnidir  - Will monitor CBC with diff      Anjelica [address] [place] [address]  3/27/2020

## 2020-03-28 NOTE — ASSESSMENT & PLAN NOTE
- Multifactorial related to underlying malignancy, LLL pneumonia and recent hospitalization due to sepsis  - Encourage PO intake  - Assist with transfers and ADL's  - PT/OT to continue once medically stable

## 2020-03-28 NOTE — ASSESSMENT & PLAN NOTE
- Chest xray reveals modest left lower lobe pneumonia  - Blood work reviewed, no leukocytosis present, WBC 9 8  - Continue IV fluids, will decrease rate to 60 ml/hr  - Continue inhalers and Mucinex  - Monitor vital signs with SpO2, keep oxygen saturation greater than 92%  - Sputum culture pending  - Continue PO Cefdinir

## 2020-03-28 NOTE — ASSESSMENT & PLAN NOTE
- Improved with IV fluids  - Will continue IV fluids, decrease rate to 60 ml/hr  - Continue Tylenol for fever/pain  - Continue PO Cefnidir  - Will monitor CBC with diff

## 2020-03-30 NOTE — TELEPHONE ENCOUNTER
I called to schedule patient's follow up with Dr Mena Mead   Patient's wife- Yandy Smith, stated they did not want to schedule at this time  And ask that we call at a later date

## 2020-04-16 PROBLEM — C15.5 MALIGNANT NEOPLASM OF LOWER THIRD OF ESOPHAGUS (HCC): Status: ACTIVE | Noted: 2020-01-01

## 2020-04-23 PROBLEM — D64.9 ANEMIA: Status: ACTIVE | Noted: 2020-01-01

## 2020-04-23 PROBLEM — K21.9 GERD (GASTROESOPHAGEAL REFLUX DISEASE): Status: ACTIVE | Noted: 2020-01-01

## 2020-04-24 PROBLEM — D61.810 PANCYTOPENIA DUE TO CHEMOTHERAPY (HCC): Status: ACTIVE | Noted: 2020-01-01

## 2020-04-24 PROBLEM — E43 SEVERE PROTEIN-CALORIE MALNUTRITION (HCC): Status: ACTIVE | Noted: 2020-01-01

## 2020-04-25 PROBLEM — R53.1 GENERALIZED WEAKNESS: Status: RESOLVED | Noted: 2020-01-01 | Resolved: 2020-01-01

## 2020-06-05 PROBLEM — R63.0 POOR APPETITE: Status: ACTIVE | Noted: 2020-01-01

## 2020-06-05 PROBLEM — R78.81 BACTEREMIA: Status: ACTIVE | Noted: 2020-01-01

## 2020-06-05 PROBLEM — K66.8 PNEUMOPERITONEUM: Status: ACTIVE | Noted: 2020-01-01

## 2020-06-05 PROBLEM — F32.A DEPRESSION: Status: ACTIVE | Noted: 2020-01-01

## 2020-06-05 PROBLEM — J18.9 PNEUMONIA DUE TO INFECTIOUS ORGANISM: Status: RESOLVED | Noted: 2020-01-01 | Resolved: 2020-01-01

## 2020-06-08 PROBLEM — R05.9 COUGH: Status: ACTIVE | Noted: 2020-01-01

## 2020-06-08 PROBLEM — A49.8 CLOSTRIDIUM DIFFICILE INFECTION: Status: ACTIVE | Noted: 2020-01-01

## 2020-06-16 PROBLEM — Z71.89 GOALS OF CARE, COUNSELING/DISCUSSION: Status: ACTIVE | Noted: 2020-01-01

## 2020-06-19 PROBLEM — K11.7 INCREASED OROPHARYNGEAL SECRETIONS: Status: ACTIVE | Noted: 2020-01-01

## 2020-06-19 PROBLEM — E87.1 HYPONATREMIA: Status: ACTIVE | Noted: 2020-01-01
